# Patient Record
Sex: FEMALE | Race: WHITE | NOT HISPANIC OR LATINO | Employment: FULL TIME | URBAN - METROPOLITAN AREA
[De-identification: names, ages, dates, MRNs, and addresses within clinical notes are randomized per-mention and may not be internally consistent; named-entity substitution may affect disease eponyms.]

---

## 2019-04-14 ENCOUNTER — OCCUPATIONAL MEDICINE (OUTPATIENT)
Dept: URGENT CARE | Facility: PHYSICIAN GROUP | Age: 64
End: 2019-04-14
Payer: COMMERCIAL

## 2019-04-14 VITALS
TEMPERATURE: 98.9 F | DIASTOLIC BLOOD PRESSURE: 78 MMHG | OXYGEN SATURATION: 96 % | HEIGHT: 62 IN | HEART RATE: 82 BPM | SYSTOLIC BLOOD PRESSURE: 124 MMHG | RESPIRATION RATE: 16 BRPM

## 2019-04-14 DIAGNOSIS — S66.912A HAND STRAIN, LEFT, INITIAL ENCOUNTER: ICD-10-CM

## 2019-04-14 PROCEDURE — 99204 OFFICE O/P NEW MOD 45 MIN: CPT | Performed by: PHYSICIAN ASSISTANT

## 2019-04-14 NOTE — LETTER
"   [facility] [place]  [address] [place] [address]. [address] - [place] [address]  Phone:  [phone] - Fax:  [phone]   [facility] Progress Report and Disability Certification  Date of Service: 4/14/2019   No Show:  No  Date / Time of Next Visit:  04/21/2019 @ 10:40 AM    Claim Information   Patient Name: Stefany Thornton  Claim Number:     Employer: [employer]  Date of Injury: 11/15/2018     Insurer / TPA:    ID / SSN:     Occupation: Plate Repair   Diagnosis: The encounter diagnosis was Hand strain, left, initial encounter.    Medical Information   Related to Industrial Injury?   Comments:indeterminate    Subjective Complaints:  DOI: 11/15/18. Left fingers and hand pain. Repetitive motion injury. Pt does gripping movements at work. Mainly 3rd and 4th fingers. Feels stiff in am, some numbness. \"Very rigid for several minutes in the morning.\" Progressively worsening. Taking NSAIDS and using OTC creams. Does not radiate. No previous injuries. No 2nd job.   Objective Findings: No obvious tenderness or deformity on exam.  When I do force full flexion of the third and fourth digit of the left hand she does feel a pulling sensation.  Range of motion and wrist within normal limits.  Finkelstein test negative.  Phalen's test negative.   strength equal.  She states it is worse in the morning and after work.   Pre-Existing Condition(s): None   Assessment:   Initial Visit    Status: Additional Care Required  Permanent Disability:No    Plan:      Diagnostics:      Comments:       Disability Information   Status: Released to Full Duty    From:     Through:   Restrictions are:     Physical Restrictions   Sitting:    Standing:    Stooping:    Bending:      Squatting:    Walking:    Climbing:    Pushing:      Pulling:    Other:    Reaching Above Shoulder (L):   Reaching Above Shoulder (R):       Reaching Below Shoulder (L):    Reaching Below Shoulder (R):      Not to exceed Weight " Limits   Carrying(hrs):   Weight Limit(lb):   Lifting(hrs):   Weight  Limit(lb):     Comments: Must wear brace at work    Repetitive Actions   Hands: i.e. Fine Manipulations from Grasping:     Feet: i.e. Operating Foot Controls:     Driving / Operate Machinery:     Physician Name: Inga Eaton P.A.-C. Physician Signature: INGA Rojo P.A.-C. e-Signature: Dr. Rory Suarez, Medical Director   Clinic Name / Location: [address]  [place] [address] Clinic Phone Number: Dept: [phone]   Appointment Time: 11:15 Am Visit Start Time: 11:48 AM   Check-In Time:  11:25 Am Visit Discharge Time:  12:21 PM    Original-Treating Physician or Chiropractor    Page 2-Insurer/TPA    Page 3-Employer    Page 4-Employee

## 2019-04-14 NOTE — PROGRESS NOTES
"Subjective:      Stefany Thornton is a 64 y.o. female who presents with Wrist Pain (Wrist and finger pain x 6 months)      DOI: 11/15/18. Left fingers and hand pain. Repetitive motion injury. Pt does gripping movements at work. Mainly 3rd and 4th fingers. Feels stiff in am, some numbness. \"Very rigid for several minutes in the morning.\" Progressively worsening. Taking NSAIDS and using OTC creams. Does not radiate. No previous injuries. No 2nd job.     HPI    ROS    Medications, Allergies, and current problem list reviewed today in Epic     Objective:     /78   Pulse 82   Temp 37.2 °C (98.9 °F) (Temporal)   Resp 16   Ht 1.575 m (5' 2\")   SpO2 96%      Physical Exam   Constitutional: She is oriented to person, place, and time. She appears well-developed and well-nourished. No distress.   Neurological: She is alert and oriented to person, place, and time.   Skin: Skin is warm and dry. She is not diaphoretic.   Psychiatric: She has a normal mood and affect. Her behavior is normal. Judgment and thought content normal.   Nursing note and vitals reviewed.      No obvious tenderness or deformity on exam.  When I do force full flexion of the third and fourth digit of the left hand she does feel a pulling sensation.  Range of motion and wrist within normal limits.  Finkelstein test negative.  Phalen's test negative.   strength equal.  She states it is worse in the morning and after work.       Assessment/Plan:     1. Hand strain, left, initial encounter       Overuse injury.  Patient placed in a brace  OTC meds and conservative measures as discussed  Follow-up 1 week  Please note that this dictation was created using voice recognition software. I have made every reasonable attempt to correct obvious errors, but I expect that there are errors of grammar and possibly content that I did not discover before finalizing the note.    "

## 2019-04-14 NOTE — LETTER
"EMPLOYEE’S CLAIM FOR COMPENSATION/ REPORT OF INITIAL TREATMENT  FORM C-4    EMPLOYEE’S CLAIM - PROVIDE ALL INFORMATION REQUESTED   First Name  Stefany Last Name  Hillary Birthdate                    1955                Sex  female Claim Number   Home Address  [address]  Age  64 y.o. 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 (if applicable)  Plate Repair    How did this injury or occupational disease occur? (Be specific an answer in detail. Use additional sheet if necessary)  On going hand/fingers pain, tingling, numbness occasionally but getting worse past couple of months. Annual physical-Nurse advised me to have it checked    If you believe that you have an occupational disease, when did you first have knowledge of the disability and it relationship to your employment?  n/a Witnesses to the Accident  n/a      Nature of Injury or Occupational Disease  Strain  Part(s) of Body Injured or Affected  Hand (L), Finger (L),     I certify that the above is true and correct to the best of my knowledge and that I have provided this information in order to obtain the benefits of [place]’s " [place] inclusive or [place] of [place]).  I hereby authorize any physician, chiropractor, surgeon, practitioner, or other person, any hospital, including [facility], any medical service organization, any insurance company, or other institution or organization to release to each other, any medical or other information, including benefits paid or payable, pertinent to this injury or disease, except information relative to diagnosis, treatment and/or counseling for AIDS, psychological conditions, alcohol or controlled substances, for which I must give specific authorization.  A Photostat of this authorization shall be as valid as the original.     Date   Place   Employee’s Signature   THIS REPORT MUST BE COMPLETED AND MAILED WITHIN 3 WORKING DAYS OF TREATMENT   Place  [facility]  Name of Facility  [facility]   Date  4/14/2019 Diagnosis  (S66.912A) Hand strain, left, initial encounter Is there evidence the injured employee was under the influence of alcohol and/or another controlled substance at the time of accident?   Hour  11:48 AM Description of Injury or Disease  The encounter diagnosis was Hand strain, left, initial encounter. No   Treatment  Overuse injury  OTC meds and conservative measures as discussed    Have you advised the patient to remain off work five days or more? No   X-Ray Findings      If Yes   From Date  To Date      From information given by the employee, together with medical evidence, can you directly connect this injury or occupational disease as job incurred?    Comments:Indeterminate If No Full Duty  Yes Modified Duty      Is additional medical care by a physician indicated?  Yes If Modified Duty, Specify any Limitations / Restrictions      Do you know of any previous injury or disease contributing to this condition or occupational disease?                            No  "  Date  4/14/2019 Print Doctor’s Name Inga Eaton P.A.-C. I certify the employer’s copy of  this form was mailed on:   Address  [address] Insurer’s Use Only     [place] [place] Zip  [address]    Provider’s Tax ID Number  [id number] Telephone  Dept: [phone]        [email or web address] Almeida P.A.-C.   e-Signature: Dr. Rory Suarez, Medical Director Degree  QUINN        ORIGINAL-TREATING PHYSICIAN OR CHIROPRACTOR    PAGE 2-INSURER/TPA    PAGE 3-EMPLOYER    PAGE 4-EMPLOYEE             Form C-4 (rev10/07)              BRIEF DESCRIPTION OF RIGHTS AND BENEFITS  (Pursuant to [place]RS 616C.050)    Notice of Injury or Occupational Disease (Incident Report Form C-1): If an injury or occupational disease (OD) arises out of and in the  course of employment, you must provide written notice to your employer as soon as practicable, but no later than 7 days after the accident or  OD. Your employer shall maintain a sufficient supply of the required forms.    Claim for Compensation (Form C-4): If medical treatment is sought, the form C-4 is available at the place of initial treatment. A completed  \"Claim for Compensation\" (Form C-4) must be filed within 90 days after an accident or OD. The treating physician or chiropractor must,  within 3 working days after treatment, complete and mail to the employer, the employer's insurer and third-party , the Claim for  Compensation.    Medical Treatment: If you require medical treatment for your on-the-job injury or OD, you may be required to select a physician or  chiropractor from a list provided by your workers’ compensation insurer, if it has contracted with an Organization for Managed Care (MCO) or  Preferred Provider Organization (PPO) or providers of health care. If your employer has not entered into a contract with an MCO or PPO, you  may select a physician or chiropractor from the Panel of Physicians and Chiropractors. Any medical costs related to " your industrial injury or  OD will be paid by your insurer.    Temporary Total Disability (TTD): If your doctor has certified that you are unable to work for a period of at least 5 consecutive days, or 5  cumulative days in a 20-day period, or places restrictions on you that your employer does not accommodate, you may be entitled to TTD  compensation.    Temporary Partial Disability (TPD): If the wage you receive upon reemployment is less than the compensation for TTD to which you are  entitled, the insurer may be required to pay you TPD compensation to make up the difference. TPD can only be paid for a maximum of 24  months.    Permanent Partial Disability (PPD): When your medical condition is stable and there is an indication of a PPD as a result of your injury or  OD, within 30 days, your insurer must arrange for an evaluation by a rating physician or chiropractor to determine the degree of your PPD. The  amount of your PPD award depends on the date of injury, the results of the PPD evaluation and your age and wage.    Permanent Total Disability (PTD): If you are medically certified by a treating physician or chiropractor as permanently and totally disabled  and have been granted a PTD status by your insurer, you are entitled to receive monthly benefits not to exceed 66 2/3% of your average  monthly wage. The amount of your PTD payments is subject to reduction if you previously received a PPD award.    Vocational Rehabilitation Services: You may be eligible for vocational rehabilitation services if you are unable to return to the job due to a  permanent physical impairment or permanent restrictions as a result of your injury or occupational disease.    Transportation and Per Lisset Reimbursement: You may be eligible for travel expenses and per lisset associated with medical treatment.    Reopening: You may be able to reopen your claim if your condition worsens after claim closure.    Appeal Process: If you disagree  with a written determination issued by the insurer or the insurer does not respond to your request, you may  appeal to the Department of Administration, , by following the instructions contained in your determination letter. You must  appeal the determination within 70 days from the date of the determination letter at [address], [address], [place], [place]  [address], or [address], [place], [place] [address]. If you disagree with the  decision, you may appeal to the  Department of Administration, . You must file your appeal within 30 days from the date of the  decision  letter at [address], [address], [place], [place] [address], or [address] [facility] [address], [place], [place] [address]. If you  disagree with a decision of an , you may file a petition for judicial review with the District Court. You must do so within 30  days of the Appeal Officer’s decision. You may be represented by an  at your own expense or you may contact the [facility] for possible  representation.    [place]  for Injured Workers (NAIW): If you disagree with a  decision, you may request that NAIW represent you  without charge at an  Hearing. For information regarding denial of benefits, you may contact the [facility] at: [address], [address], [place] [address], [phone], or [address], [place] [address], [phone]    To File a Complaint with the Division: If you wish to file a complaint with the  of the Division of Industrial Relations (DIR),  please contact the Workers’ Compensation Section, [address], [address], [place], [place] [address], telephone [phone]) [phone], or  [address] [facility] [place], [place] [address], telephone [phone].    For assistance with Workers’ Compensation Issues: you  may contact the Office of the Governor Consumer Health Assistance, [address] [place] [address], [facility] [address], [place] [address], Toll Free 1-465.876.3810, Web site: http://govcha.[facility].[place].us, E-mail  [email or web address].                                                                                                                                                                                                                                   __________________________________________________________________                                                                   _________________                Employee Name / Signature                                                                                                                                                       Date                                                                                                                                                                                                     D-2 (rev. 10/07)

## 2019-04-19 ENCOUNTER — OCCUPATIONAL MEDICINE (OUTPATIENT)
Dept: URGENT CARE | Facility: PHYSICIAN GROUP | Age: 64
End: 2019-04-19
Payer: COMMERCIAL

## 2019-04-19 VITALS
TEMPERATURE: 98.9 F | DIASTOLIC BLOOD PRESSURE: 80 MMHG | OXYGEN SATURATION: 94 % | SYSTOLIC BLOOD PRESSURE: 122 MMHG | HEIGHT: 62 IN | HEART RATE: 76 BPM

## 2019-04-19 DIAGNOSIS — S66.912D HAND STRAIN, LEFT, SUBSEQUENT ENCOUNTER: ICD-10-CM

## 2019-04-19 PROCEDURE — 99214 OFFICE O/P EST MOD 30 MIN: CPT | Performed by: PHYSICIAN ASSISTANT

## 2019-04-19 NOTE — PROGRESS NOTES
"Subjective:      Stefany Thornton is a 64 y.o. female who presents with Hand Injury (WC L hand pain, pt states it still hurts, pt states in the morning she isnt able to move it)      DOI: 11/15/19.  Left hand strain, overuse injury.  Some improvement last week.  Still having pain and stiffness in the a.m.  Wearing brace at work and when sleeping.  Tolerating work.  No new symptoms.     HPI    ROS    Medications, Allergies, and current problem list reviewed today in Epic     Objective:     /80 (BP Location: Left arm, Patient Position: Sitting, BP Cuff Size: Adult)   Pulse 76   Temp 37.2 °C (98.9 °F) (Temporal)   Ht 1.575 m (5' 2\")   SpO2 94%      Physical Exam   Constitutional: She is oriented to person, place, and time. She appears well-developed and well-nourished. No distress.   Neurological: She is alert and oriented to person, place, and time.   Skin: Skin is warm and dry. She is not diaphoretic.   Psychiatric: She has a normal mood and affect. Her behavior is normal. Judgment and thought content normal.   Nursing note and vitals reviewed.      No obvious tenderness or deformity on exam.  When I do force full flexion of the third and fourth digit of the left hand she does feel a pulling sensation.  Range of motion and wrist within normal limits.  Finkelstein test negative.  Phalen's test negative.   strength equal.  She states it is worse in the morning and after work.       Assessment/Plan:     1. Hand strain, left, subsequent encounter  REFERRAL TO PHYSICAL THERAPY Reason for Therapy: Eval/Treat/Report     No significant improvement.  Continue to wear brace.  Referral to physical therapy    Please note that this dictation was created using voice recognition software. I have made every reasonable attempt to correct obvious errors, but I expect that there are errors of grammar and possibly content that I did not discover before finalizing the note.    "

## 2019-04-19 NOTE — LETTER
[facility]  [address] [place] [address]. [address] - [place] [address]  Phone:  [phone] - Fax:  [phone]   [facility] Progress Report and Disability Certification  Date of Service: 4/19/2019   No Show:  No  Date / Time of Next Visit:  2 weeks   Claim Information   Patient Name: Stefany Thornton  Claim Number:     Employer: [employer]  Date of Injury: 11/15/2018     Insurer / TPA: [facility] [employer]  ID / SSN:     Occupation: Plate Repair   Diagnosis: The encounter diagnosis was Hand strain, left, subsequent encounter.    Medical Information   Related to Industrial Injury?   Comments:Indeterminate    Subjective Complaints:  DOI: 11/15/19.  Left hand strain, overuse injury.  Some improvement last week.  Still having pain and stiffness in the a.m.  Wearing brace at work and when sleeping.  Tolerating work.  No new symptoms.   Objective Findings: No obvious tenderness or deformity on exam.  When I do force full flexion of the third and fourth digit of the left hand she does feel a pulling sensation.  Range of motion and wrist within normal limits.  Finkelstein test negative.  Phalen's test negative.   strength equal.  She states it is worse in the morning and after work.   Pre-Existing Condition(s):     Assessment:   Condition Same    Status: Additional Care Required  Permanent Disability:No    Plan: PT    Diagnostics:      Comments:       Disability Information   Status: Released to Full Duty    From:     Through:   Restrictions are:     Physical Restrictions   Sitting:    Standing:    Stooping:    Bending:      Squatting:    Walking:    Climbing:    Pushing:      Pulling:    Other:    Reaching Above Shoulder (L):   Reaching Above Shoulder (R):       Reaching Below Shoulder (L):    Reaching Below Shoulder (R):      Not to exceed Weight Limits   Carrying(hrs):   Weight Limit(lb):   Lifting(hrs):   Weight  Limit(lb):     Comments:      Repetitive Actions   Hands:  i.e. Fine Manipulations from Grasping:     Feet: i.e. Operating Foot Controls:     Driving / Operate Machinery:     Physician Name: Inga Eaton P.A.-C. Physician Signature: INGA Rojo P.A.-C. e-Signature: Dr. Rory Suarez, Medical Director   Clinic Name / Location: [address]  [place] [address] Clinic Phone Number: Dept: [phone]   Appointment Time: 4:05 Pm Visit Start Time: 4:07 PM   Check-In Time:  4:03 Pm Visit Discharge Time:  4:37 PM    Original-Treating Physician or Chiropractor    Page 2-Insurer/TPA    Page 3-Employer    Page 4-Employee

## 2019-05-03 ENCOUNTER — OCCUPATIONAL MEDICINE (OUTPATIENT)
Dept: URGENT CARE | Facility: PHYSICIAN GROUP | Age: 64
End: 2019-05-03
Payer: COMMERCIAL

## 2019-05-03 VITALS
HEIGHT: 62 IN | TEMPERATURE: 98.3 F | HEART RATE: 108 BPM | DIASTOLIC BLOOD PRESSURE: 82 MMHG | SYSTOLIC BLOOD PRESSURE: 120 MMHG | OXYGEN SATURATION: 94 %

## 2019-05-03 DIAGNOSIS — S66.912S: ICD-10-CM

## 2019-05-03 DIAGNOSIS — M77.8 TENDINITIS OF LEFT HAND: ICD-10-CM

## 2019-05-03 PROCEDURE — 99213 OFFICE O/P EST LOW 20 MIN: CPT | Performed by: PHYSICIAN ASSISTANT

## 2019-05-03 ASSESSMENT — ENCOUNTER SYMPTOMS
RESPIRATORY NEGATIVE: 1
NEUROLOGICAL NEGATIVE: 1
CONSTITUTIONAL NEGATIVE: 1
GASTROINTESTINAL NEGATIVE: 1

## 2019-05-03 NOTE — PROGRESS NOTES
"Subjective:      Stefany Thornton is a 64 y.o. female who presents with Arm Injury ( FUV pt states her arm is doing better, fingers are still hard to move)      DOI: 11/15/18.  \"Left hand strain, overuse injury.  Some improvement last week.  Still having pain and stiffness in the a.m.  Wearing brace at work and when sleeping.  Tolerating work.  No new symptoms.\"  Patient follows up to report some mild improvement at this visit. She was referred to PT but has not made contact with referral department yet.  Brace helps and she is tolerating her full duties.   No new symptoms.  Mild improvement in ROM.      Arm Injury     as above.     Review of Systems   Constitutional: Negative.    Respiratory: Negative.    Gastrointestinal: Negative.    Musculoskeletal:        SEE HPI   Skin: Negative.    Neurological: Negative.    Endo/Heme/Allergies: Negative.        PMH:  has no past medical history on file.  MEDS: No current outpatient prescriptions on file.  ALLERGIES: No Known Allergies  SURGHX: No past surgical history on file.  SOCHX:  reports that she has never smoked. She has never used smokeless tobacco.  FH: Family history was reviewed, no pertinent findings to report   Objective:     /82 (BP Location: Left arm, Patient Position: Sitting, BP Cuff Size: Adult)   Pulse (!) 108   Temp 36.8 °C (98.3 °F) (Temporal)   Ht 1.575 m (5' 2\")   SpO2 94%      Physical Exam   Constitutional: She is oriented to person, place, and time. She appears well-developed and well-nourished. No distress.   Cardiovascular: Normal rate.    Pulmonary/Chest: Effort normal.   Neurological: She is alert and oriented to person, place, and time.   Skin: Skin is warm and dry.     Left hand:  There is TTP on palmar surface near head of 3rd and 4th metacarpals.   There is no overt swelling.  FROM of digits however mild pain with full flexion of 3rd and 4th digits.  Distal CMS WNL       Assessment/Plan:     1. Hand strain, left, sequela     2. " Tendinitis of left hand         PT follow up.  Referral has been received, instructed to make appt to get this sched.   Continue NSAID prn.  Brace prn.   Transfer to [facility] for follow up after PT start    Danielle Johns P.A.-C.

## 2019-05-03 NOTE — LETTER
"   [facility]  [address] [place] [address]. [address] - [place] [address]  Phone:  [phone] - Fax:  [phone]   [facility] Progress Report and Disability Certification  Date of Service: 5/3/2019   No Show:  No  Date / Time of Next Visit: 5/21/2019   Claim Information   Patient Name: Stefany Thornton  Claim Number:     Employer: [employer]  Date of Injury: 11/15/2018     Insurer / TPA: [employer]  ID / SSN:     Occupation: Plate Repair   Diagnosis: Diagnoses of Hand strain, left, sequela and Tendinitis of left hand were pertinent to this visit.    Medical Information   Related to Industrial Injury?   Comments:Indeterminate     Subjective Complaints:  DOI: 11/15/18.  \"Left hand strain, overuse injury.  Some improvement last week.  Still having pain and stiffness in the a.m.  Wearing brace at work and when sleeping.  Tolerating work.  No new symptoms.\"  Patient follows up to report some mild improvement at this visit. She was referred to PT but has not made contact with referral department yet.  Brace helps and she is tolerating her full duties.   No new symptoms.  Mild improvement in ROM.    Objective Findings: Left hand:  There is TTP on palmar surface near head of 3rd and 4th metacarpals.   There is no overt swelling.  FROM of digits however mild pain with full flexion of 3rd and 4th digits.  Distal CMS WNL   Pre-Existing Condition(s):     Assessment:   Condition Improved    Status: Discharged / Care Transfer  Permanent Disability:No    Plan: PT    Diagnostics:      Comments:       Disability Information   Status: Released to Full Duty    From:  5/3/2019  Through: 5/21/2019 Restrictions are:     Physical Restrictions   Sitting:    Standing:    Stooping:    Bending:      Squatting:    Walking:    Climbing:    Pushing:      Pulling:    Other:    Reaching Above Shoulder (L):   Reaching Above Shoulder (R):       Reaching Below Shoulder (L):    Reaching Below " Shoulder (R):      Not to exceed Weight Limits   Carrying(hrs):   Weight Limit(lb):   Lifting(hrs):   Weight  Limit(lb):     Comments: PT follow up.  Referral has been received, instructed to make appt to get this sched.   Continue NSAID prn.  Brace prn.   Transfer to [facility] for follow up after PT start    Repetitive Actions   Hands: i.e. Fine Manipulations from Grasping:     Feet: i.e. Operating Foot Controls:     Driving / Operate Machinery:     Physician Name: Abhijit Johns P.A.-C. Physician Signature: ABHIJIT Cheema P.A.-C. e-Signature: Dr. Rory Suarez, Medical Director   Clinic Name / Location: [address]  [place] [address] Clinic Phone Number: Dept: [phone]   Appointment Time: 12:40 Pm Visit Start Time: 12:44 PM   Check-In Time:  12:37 Pm Visit Discharge Time: 1:27 PM   Original-Treating Physician or Chiropractor    Page 2-Insurer/TPA    Page 3-Employer    Page 4-Employee

## 2019-07-15 ENCOUNTER — OFFICE VISIT (OUTPATIENT)
Dept: URGENT CARE | Facility: PHYSICIAN GROUP | Age: 64
End: 2019-07-15
Payer: COMMERCIAL

## 2019-07-15 ENCOUNTER — APPOINTMENT (OUTPATIENT)
Dept: RADIOLOGY | Facility: IMAGING CENTER | Age: 64
End: 2019-07-15
Attending: NURSE PRACTITIONER
Payer: COMMERCIAL

## 2019-07-15 VITALS
HEIGHT: 62 IN | SYSTOLIC BLOOD PRESSURE: 130 MMHG | DIASTOLIC BLOOD PRESSURE: 80 MMHG | OXYGEN SATURATION: 98 % | HEART RATE: 88 BPM | RESPIRATION RATE: 18 BRPM | TEMPERATURE: 98.4 F

## 2019-07-15 DIAGNOSIS — M25.461 EFFUSION OF RIGHT KNEE: ICD-10-CM

## 2019-07-15 DIAGNOSIS — M25.561 ACUTE PAIN OF RIGHT KNEE: ICD-10-CM

## 2019-07-15 PROCEDURE — 99214 OFFICE O/P EST MOD 30 MIN: CPT | Performed by: NURSE PRACTITIONER

## 2019-07-15 PROCEDURE — 73564 X-RAY EXAM KNEE 4 OR MORE: CPT | Mod: TC,RT | Performed by: NURSE PRACTITIONER

## 2019-07-15 RX ORDER — MELOXICAM 7.5 MG/1
7.5 TABLET ORAL
Qty: 30 TAB | Refills: 0 | Status: SHIPPED | OUTPATIENT
Start: 2019-07-15 | End: 2020-09-02

## 2019-07-15 NOTE — PROGRESS NOTES
Chief Complaint   Patient presents with   • Knee Pain     right knee pain, has been hurting for several months. hurts when she walks and goes up and down stairs.        HISTORY OF PRESENT ILLNESS: Patient is a 64 y.o. female who presents to urgent care today with complaints of right knee pain. Notes that for the past 7 weeks she has had right knee pain. She cannot recall any injury but pain has been worsening. Pain started posterior but now has progressed to medial. Pain is exacerbated by ambulation and stairs. She has tried ibuprofen and a brace for symptom relief.  She denies previous injuries to this knee.  She otherwise feels well and denies any fever, chills, malaise.        There are no active problems to display for this patient.      Allergies:Patient has no known allergies.    No current [facility]-ordered outpatient prescriptions on file.     No current [facility]-ordered facility-administered medications on file.        History reviewed. No pertinent past medical history.    Social History   Substance Use Topics   • Smoking status: Never Smoker   • Smokeless tobacco: Never Used   • Alcohol use Not on file       No family status information on file.   History reviewed. No pertinent family history.    ROS:  Review of Systems   Constitutional: Negative for fever, chills, weight loss, malaise, and fatigue.   HENT: Negative for ear pain, nosebleeds, congestion, sore throat and neck pain.    Eyes: Negative for vision changes.   Neuro: Negative for headache, sensory changes, weakness, seizure, LOC.   Cardiovascular: Negative for chest pain, palpitations, orthopnea and leg swelling.   Respiratory: Negative for cough, sputum production, shortness of breath and wheezing.   Gastrointestinal: Negative for abdominal pain, nausea, vomiting or diarrhea.   Genitourinary: Negative for dysuria, urgency and frequency.  Musculoskeletal: Positive for right knee pain.  Negative for falls, neck pain, back pain, myalgias.   Skin: Negative  "for rash, diaphoresis.     Exam:  /80 (BP Location: Right arm, Patient Position: Sitting, BP Cuff Size: Adult)   Pulse 88   Temp 36.9 °C (98.4 °F) (Temporal)   Resp 18   Ht 1.575 m (5' 2\")   SpO2 98%   General: well-nourished, well-developed female in NAD  Head: normocephalic, atraumatic  Eyes: PERRLA, no conjunctival injection, acuity grossly intact, lids normal.  Ears: normal shape and symmetry, no tenderness, no discharge. External canals are without any significant edema or erythema. Gross auditory acuity is intact.  Nose: symmetrical without tenderness, no discharge.  Mouth/Throat: reasonable hygiene, no erythema, exudates or tonsillar enlargement.  Neck: no masses, range of motion within normal limits, no tracheal deviation. No obvious thyroid enlargement.   Lymph: no cervical adenopathy. No supraclavicular adenopathy.   Neuro: alert and oriented. Cranial nerves 1-12 grossly intact. No sensory deficit.   Cardiovascular: regular rate and rhythm. No edema.  Pulmonary: no distress. Chest is symmetrical with respiration, no wheezes, crackles, or rhonchi.   Musculoskeletal: no clubbing, appropriate muscle tone, gait is stable.  Right knee: normal in appearance, mild soft tissue tenderness to medial aspect, full range of motion, no obvious laxity noted.  Skin: warm, dry, intact, no clubbing, no cyanosis, no rashes.   Psych: appropriate mood, affect, judgement.         Assessment/Plan:    1. Acute pain of right knee  DX-KNEE COMPLETE 4+ RIGHT    meloxicam (MOBIC) 7.5 MG Tab    REFERRAL TO ORTHOPEDICS   2. Effusion of right knee  meloxicam (MOBIC) 7.5 MG Tab    REFERRAL TO ORTHOPEDICS             7/15/2019 8:28 AM    HISTORY/REASON FOR EXAM:  Atraumatic Pain/Swelling/Deformity      TECHNIQUE/EXAM DESCRIPTION AND NUMBER OF VIEWS:  4 views of the RIGHT knee.    COMPARISON: None    FINDINGS:  There is no evidence of fracture or dislocation. Alignment is maintained.   There is spurring of the superior aspect of " the patella. There is a small joint effusion.     Impression       No evidence of acute fracture or dislocation.    Small joint effusion.         X-ray negative, suspect soft tissue etiology. Placed in hinged knee brace. Given a referral to ortho for follow up as MRI may be indicated. RICE. Mobic, take with food, DC OTC NSAIDS.   Supportive care, differential diagnoses, and indications for immediate follow-up discussed with patient.   Pathogenesis of diagnosis discussed including typical length and natural progression.   Instructed to return to clinic or nearest emergency department for any change in condition, further concerns, or worsening of symptoms.  Patient states understanding of the plan of care and discharge instructions.  Instructed to make an appointment, for follow up, with her primary care provider.        Please note that this dictation was created using voice recognition software. I have made every reasonable attempt to correct obvious errors, but I expect that there are errors of grammar and possibly content that I did not discover before finalizing the note.      SOPHIA Zabala.

## 2020-09-02 ENCOUNTER — OFFICE VISIT (OUTPATIENT)
Dept: MEDICAL GROUP | Facility: LAB | Age: 65
End: 2020-09-02
Payer: COMMERCIAL

## 2020-09-02 VITALS
BODY MASS INDEX: 32.07 KG/M2 | HEART RATE: 88 BPM | TEMPERATURE: 97.1 F | RESPIRATION RATE: 16 BRPM | OXYGEN SATURATION: 96 % | DIASTOLIC BLOOD PRESSURE: 80 MMHG | WEIGHT: 181 LBS | SYSTOLIC BLOOD PRESSURE: 140 MMHG | HEIGHT: 63 IN

## 2020-09-02 DIAGNOSIS — M17.0 PRIMARY OSTEOARTHRITIS OF BOTH KNEES: ICD-10-CM

## 2020-09-02 DIAGNOSIS — Z12.11 SCREEN FOR COLON CANCER: ICD-10-CM

## 2020-09-02 DIAGNOSIS — Z12.11 SCREENING FOR COLORECTAL CANCER: ICD-10-CM

## 2020-09-02 DIAGNOSIS — Z12.31 ENCOUNTER FOR SCREENING MAMMOGRAM FOR BREAST CANCER: ICD-10-CM

## 2020-09-02 DIAGNOSIS — Z12.12 SCREENING FOR COLORECTAL CANCER: ICD-10-CM

## 2020-09-02 DIAGNOSIS — L30.9 ECZEMA, UNSPECIFIED TYPE: ICD-10-CM

## 2020-09-02 DIAGNOSIS — F17.200 SMOKER: ICD-10-CM

## 2020-09-02 DIAGNOSIS — Z00.00 PREVENTATIVE HEALTH CARE: ICD-10-CM

## 2020-09-02 DIAGNOSIS — Z80.0 FAMILY HISTORY OF COLON CANCER: ICD-10-CM

## 2020-09-02 PROCEDURE — 99215 OFFICE O/P EST HI 40 MIN: CPT | Performed by: NURSE PRACTITIONER

## 2020-09-02 RX ORDER — TRIAMCINOLONE ACETONIDE 1 MG/G
CREAM TOPICAL
Qty: 45 G | Refills: 2 | Status: SHIPPED | OUTPATIENT
Start: 2020-09-02 | End: 2022-10-19

## 2020-09-02 RX ORDER — VITAMIN E 268 MG
400 CAPSULE ORAL DAILY
COMMUNITY
End: 2022-10-19

## 2020-09-02 RX ORDER — MAGNESIUM OXIDE/MAG AA CHELATE 300 MG
CAPSULE ORAL DAILY
COMMUNITY

## 2020-09-02 RX ORDER — MULTIVIT WITH MINERALS/LUTEIN
TABLET ORAL DAILY
COMMUNITY

## 2020-09-02 RX ORDER — MELOXICAM 7.5 MG/1
7.5 TABLET ORAL
Qty: 90 TAB | Refills: 1 | Status: SHIPPED | OUTPATIENT
Start: 2020-09-02 | End: 2021-07-09 | Stop reason: SDUPTHER

## 2020-09-02 ASSESSMENT — PATIENT HEALTH QUESTIONNAIRE - PHQ9: CLINICAL INTERPRETATION OF PHQ2 SCORE: 0

## 2020-09-02 NOTE — ASSESSMENT & PLAN NOTE
Chronic issue.  Right knee hurts worse than left.  Right knee aches.  voltaren gel and meloxicam are helpful.  Also takes tylenol prn.  Has not seen orthopedics. Had x-rays one year ago - showed arthritis.   Use to do up to 200 squats per day.   Hears clicking / popping in right knee.

## 2020-09-02 NOTE — ASSESSMENT & PLAN NOTE
"Mother at age 84.  Patient's bowel pattern has changed from formed \"cat like stools,\" to random bouts of diarrhea a few days per week for a few hours.   Denies black / bloody stools but has cramping occasionally.  No unintentional weight loss.    "

## 2020-09-02 NOTE — PROGRESS NOTES
"Chief Complaint   Patient presents with   • Bowel Problem   • Rash     right leg X 1 year   • Establish Care       HPI  Nette is a 64 yo female, here to establish care today, she is the wife of Urban Thornton, an established patient with me.  She mentions a past medical history of knee arthritis, otherwise states she is very healthy.  She is still working full-time, stands on her feet for up to 10 hours a day at the [employer].  She is a smoker although she tells me in her 30s that she stopped smoking for about 10 years.  She drinks 1-2 drinks most nights of the week.  She does not regularly exercise.  See family and surgical history below.  No daily prescription medications.  Has never had a colonoscopy, has not had a mammogram in years and is overdue for a Pap smear, cannot recall her last Pap smear.  Has not had blood work done in several years.  Cannot recall her last vaccination.    Primary osteoarthritis of both knees  Chronic issue.  Right knee hurts worse than left.  Right knee aches.  voltaren gel and meloxicam are helpful.  Also takes tylenol prn.  Has not seen orthopedics. Had x-rays one year ago - showed arthritis.   Use to do up to 200 squats per day.   Hears clicking / popping in right knee.        Family history of colon cancer  Mother at age 84.  Patient's bowel pattern has changed from formed \"cat like stools,\" to random bouts of diarrhea a few days per week for a few hours.   Denies black / bloody stools but has cramping occasionally.  No unintentional weight loss.        Family History   Problem Relation Age of Onset   • Heart Disease Mother    • Cancer Mother         colon   • Cancer Father         prostate   • Cancer Brother         lung - smoker     Past Surgical History:   Procedure Laterality Date   • HAND SURGERY      right wrist ligament contraction release in her 30's       Review Of Systems  Denies fever, chills, or sweats, unexplained weight changes (not following diet currently or " "exercising)  Skin: negative for rash, changing moles, abnormal pigmentation, hair or nail changes.  Eyes: negative for visual blurring, double vision, eye pain, floaters and discharge from eyes  Ears/Nose/Throat: negative for tinnitus, vertigo, oral or dental problem, hoarseness, frequent URI's, sinus trouble, persistent sore throat..  Respiratory: negative for persistent cough, hemoptysis, dyspnea, wheezing  Cardiovascular: negative for palpitations, tachycardia, irregular heart beat, chest pain or pressure or peripheral edema.   Breast: Denies breast tenderness, mass,  changes in size or contour, or abnormal cyclic discomfort.  Gastrointestinal: Positive for stool pattern change as above but this is been ongoing for years.  Denies nausea or vomiting.  Genitourinary: negative for dysuria, frequency, incontinence, abnormal vaginal discharge, dysparunia or abnormal vaginal bleeding. Has nocturia once per night.   Musculoskeletal: Positive for bilateral knee pain.  Neurologic: negative for new or changing headaches, new weakness tremor  Psychiatric: negative for mood disturbance, anxiety, depression, sexual difficulties  Hematologic/Lymphatic/Immunologic: negative for pallor, unusual bruising, swollen glands, HIV risk factors  Endocrine: negative for temperature intolerance, polydipsia, polyuria.    Exam:  /80   Pulse 88   Temp 36.2 °C (97.1 °F)   Resp 16   Ht 1.6 m (5' 3\")   Wt 82.1 kg (181 lb)   SpO2 96%   Constitutional: Alert, no distress, plus 3 vital signs  Skin:  Warm, dry, no rashes invisible areas.  She has a few patches of dry skin to her anterior right calf and a small patch to her right thigh that she inquires about, stating these patches come and go and have for years.  The patches she states are typically dry and itchy.  Eye: Equal, round and reactive, conjunctiva clear  ENMT: Lips without lesions, good dentition, oropharynx clear    Neck: Trachea midline, no masses, no " thyromegaly  Respiratory: Unlabored respiration, lungs clear to auscultation, no wheezes, no rhonchi  Cardiovascular: Normal rate and rhythm, no murmur, no edema  Abdomen: Soft, nontender.  Musculoskeletal: She does have a slight swelling to her right knee without erythema or decreased range of motion.  She experiences pain with active resistance to extension and standing only on her right knee.  Otherwise she has full range of motion to the rest of her joints.  Psych: Alert, pleasant, well-groomed, normal affect    Assessment / Plan / Medical Decision makin. Primary osteoarthritis of both knees  -She would like to have an MRI of her right knee as it is been increasingly painful for her over the past few years.  She has benefited from meloxicam and Voltaren gel in the past and these were refilled for her.  Discussed GI and renal precautions with meloxicam.  Based on MRI, she is interested in seeing orthopedics but not interested in physical therapy at this time.  We discussed braces, stretches and strengthening.  - MR-KNEE-W/O RIGHT; Future  - meloxicam (MOBIC) 7.5 MG Tab; Take 1 Tab by mouth 1 time daily as needed for Moderate Pain, Severe Pain or Inflammation. Take with food.  Dispense: 90 Tab; Refill: 1  - Diclofenac Sodium 1 % Gel; Apply 2 g to skin as directed 3 times a day as needed.  Dispense: 350 g; Refill: 2    2. Eczema, unspecified type  -Discussed avoidance of harsh bar soap.  Trial of triamcinolone 1-2 times daily for 1 to 2 weeks.  Discussed skin thinning properties of corticosteroids.  Encouraged emollient lotions.  - triamcinolone acetonide (KENALOG) 0.1 % Cream; AAA twice daily as needed for leg rash  Dispense: 45 g; Refill: 2    3. Family history of colon cancer  -She has never had a colonoscopy and referral was placed for this today, particularly considering her bowel pattern change over the past few years.  Fortunately she is not losing weight unintentionally, having abdominal pain or  hematochezia.    4. Encounter for screening mammogram for breast cancer  - MA-SCREENING MAMMO BILAT W/CAD; Future    5. Screening for colorectal cancer  - REFERRAL TO GI FOR COLONOSCOPY    6. Screen for colon cancer  - REFERRAL TO GI FOR COLONOSCOPY    7. Preventative health care  -Recommend colonoscopy, mammogram and full updated lab panel, then returning for Pap smear and physical when her  comes in November.  - CBC WITH DIFFERENTIAL; Future  - Comp Metabolic Panel; Future  - Lipid Profile; Future  - TSH; Future  -Encouraged her to consider Tdap, pneumonia, shingles and flu vaccines which she would like to think about.    Total face to face time 40 minutes of which over 50% of this visit is spent in counseling, education and outlining a plan of treatment and coordination of care for the above conditions. This included but was not limited to discussion of medication options and potential risks related to the medications, referral and specialty care options. All patient questions were answered

## 2020-10-05 ENCOUNTER — TELEPHONE (OUTPATIENT)
Dept: MEDICAL GROUP | Facility: LAB | Age: 65
End: 2020-10-05

## 2020-10-05 DIAGNOSIS — Z11.59 ENCOUNTER FOR SCREENING FOR OTHER VIRAL DISEASES: ICD-10-CM

## 2020-10-05 NOTE — TELEPHONE ENCOUNTER
Patient called and states that her  works with and eats lunch with a co worker that had tested positive for COVID today. Requesting order for test so they can both return to work. No current symtoms

## 2020-10-07 ENCOUNTER — HOSPITAL ENCOUNTER (OUTPATIENT)
Dept: LAB | Facility: MEDICAL CENTER | Age: 65
End: 2020-10-07
Attending: NURSE PRACTITIONER
Payer: COMMERCIAL

## 2020-10-07 LAB — COVID ORDER STATUS COVID19: NORMAL

## 2020-10-07 PROCEDURE — U0003 INFECTIOUS AGENT DETECTION BY NUCLEIC ACID (DNA OR RNA); SEVERE ACUTE RESPIRATORY SYNDROME CORONAVIRUS 2 (SARS-COV-2) (CORONAVIRUS DISEASE [COVID-19]), AMPLIFIED PROBE TECHNIQUE, MAKING USE OF HIGH THROUGHPUT TECHNOLOGIES AS DESCRIBED BY CMS-2020-01-R: HCPCS

## 2020-10-07 PROCEDURE — C9803 HOPD COVID-19 SPEC COLLECT: HCPCS

## 2020-10-08 ENCOUNTER — TELEPHONE (OUTPATIENT)
Dept: MEDICAL GROUP | Facility: LAB | Age: 65
End: 2020-10-08

## 2020-10-08 LAB
SARS-COV-2 RNA RESP QL NAA+PROBE: NOTDETECTED
SPECIMEN SOURCE: NORMAL

## 2020-10-08 NOTE — TELEPHONE ENCOUNTER
----- Message from YOGI Butts sent at 10/8/2020 11:36 AM PDT -----  Please let Urban and Nette know that covid tests are negative.

## 2020-11-13 ENCOUNTER — HOSPITAL ENCOUNTER (OUTPATIENT)
Dept: RADIOLOGY | Facility: MEDICAL CENTER | Age: 65
End: 2020-11-13
Attending: NURSE PRACTITIONER
Payer: COMMERCIAL

## 2020-11-13 ENCOUNTER — HOSPITAL ENCOUNTER (OUTPATIENT)
Dept: LAB | Facility: MEDICAL CENTER | Age: 65
End: 2020-11-13
Attending: NURSE PRACTITIONER
Payer: COMMERCIAL

## 2020-11-13 DIAGNOSIS — Z00.00 PREVENTATIVE HEALTH CARE: ICD-10-CM

## 2020-11-13 DIAGNOSIS — M17.0 PRIMARY OSTEOARTHRITIS OF BOTH KNEES: ICD-10-CM

## 2020-11-13 DIAGNOSIS — Z12.31 ENCOUNTER FOR SCREENING MAMMOGRAM FOR BREAST CANCER: ICD-10-CM

## 2020-11-13 LAB
ALBUMIN SERPL BCP-MCNC: 4.2 G/DL (ref 3.2–4.9)
ALBUMIN/GLOB SERPL: 1.4 G/DL
ALP SERPL-CCNC: 74 U/L (ref 30–99)
ALT SERPL-CCNC: 16 U/L (ref 2–50)
ANION GAP SERPL CALC-SCNC: 11 MMOL/L (ref 7–16)
AST SERPL-CCNC: 13 U/L (ref 12–45)
BASOPHILS # BLD AUTO: 1 % (ref 0–1.8)
BASOPHILS # BLD: 0.08 K/UL (ref 0–0.12)
BILIRUB SERPL-MCNC: 0.5 MG/DL (ref 0.1–1.5)
BUN SERPL-MCNC: 19 MG/DL (ref 8–22)
CALCIUM SERPL-MCNC: 9.6 MG/DL (ref 8.5–10.5)
CHLORIDE SERPL-SCNC: 103 MMOL/L (ref 96–112)
CHOLEST SERPL-MCNC: 216 MG/DL (ref 100–199)
CO2 SERPL-SCNC: 27 MMOL/L (ref 20–33)
CREAT SERPL-MCNC: 0.84 MG/DL (ref 0.5–1.4)
EOSINOPHIL # BLD AUTO: 0.26 K/UL (ref 0–0.51)
EOSINOPHIL NFR BLD: 3.3 % (ref 0–6.9)
ERYTHROCYTE [DISTWIDTH] IN BLOOD BY AUTOMATED COUNT: 48.8 FL (ref 35.9–50)
FASTING STATUS PATIENT QL REPORTED: NORMAL
GLOBULIN SER CALC-MCNC: 2.9 G/DL (ref 1.9–3.5)
GLUCOSE SERPL-MCNC: 100 MG/DL (ref 65–99)
HCT VFR BLD AUTO: 47.3 % (ref 37–47)
HDLC SERPL-MCNC: 87 MG/DL
HGB BLD-MCNC: 15.4 G/DL (ref 12–16)
IMM GRANULOCYTES # BLD AUTO: 0.02 K/UL (ref 0–0.11)
IMM GRANULOCYTES NFR BLD AUTO: 0.3 % (ref 0–0.9)
LDLC SERPL CALC-MCNC: 113 MG/DL
LYMPHOCYTES # BLD AUTO: 2.25 K/UL (ref 1–4.8)
LYMPHOCYTES NFR BLD: 28.1 % (ref 22–41)
MCH RBC QN AUTO: 30.9 PG (ref 27–33)
MCHC RBC AUTO-ENTMCNC: 32.6 G/DL (ref 33.6–35)
MCV RBC AUTO: 94.8 FL (ref 81.4–97.8)
MONOCYTES # BLD AUTO: 0.65 K/UL (ref 0–0.85)
MONOCYTES NFR BLD AUTO: 8.1 % (ref 0–13.4)
NEUTROPHILS # BLD AUTO: 4.74 K/UL (ref 2–7.15)
NEUTROPHILS NFR BLD: 59.2 % (ref 44–72)
NRBC # BLD AUTO: 0 K/UL
NRBC BLD-RTO: 0 /100 WBC
PLATELET # BLD AUTO: 310 K/UL (ref 164–446)
PMV BLD AUTO: 10.4 FL (ref 9–12.9)
POTASSIUM SERPL-SCNC: 4.8 MMOL/L (ref 3.6–5.5)
PROT SERPL-MCNC: 7.1 G/DL (ref 6–8.2)
RBC # BLD AUTO: 4.99 M/UL (ref 4.2–5.4)
SODIUM SERPL-SCNC: 141 MMOL/L (ref 135–145)
TRIGL SERPL-MCNC: 78 MG/DL (ref 0–149)
TSH SERPL DL<=0.005 MIU/L-ACNC: 1.09 UIU/ML (ref 0.38–5.33)
WBC # BLD AUTO: 8 K/UL (ref 4.8–10.8)

## 2020-11-13 PROCEDURE — 85025 COMPLETE CBC W/AUTO DIFF WBC: CPT

## 2020-11-13 PROCEDURE — 77067 SCR MAMMO BI INCL CAD: CPT

## 2020-11-13 PROCEDURE — 36415 COLL VENOUS BLD VENIPUNCTURE: CPT

## 2020-11-13 PROCEDURE — 84443 ASSAY THYROID STIM HORMONE: CPT

## 2020-11-13 PROCEDURE — 80053 COMPREHEN METABOLIC PANEL: CPT

## 2020-11-13 PROCEDURE — 73721 MRI JNT OF LWR EXTRE W/O DYE: CPT | Mod: RT

## 2020-11-13 PROCEDURE — 80061 LIPID PANEL: CPT

## 2020-11-19 ENCOUNTER — OFFICE VISIT (OUTPATIENT)
Dept: MEDICAL GROUP | Facility: LAB | Age: 65
End: 2020-11-19
Payer: COMMERCIAL

## 2020-11-19 ENCOUNTER — HOSPITAL ENCOUNTER (OUTPATIENT)
Facility: MEDICAL CENTER | Age: 65
End: 2020-11-19
Attending: NURSE PRACTITIONER
Payer: COMMERCIAL

## 2020-11-19 VITALS
BODY MASS INDEX: 32.43 KG/M2 | WEIGHT: 183 LBS | DIASTOLIC BLOOD PRESSURE: 75 MMHG | HEIGHT: 63 IN | HEART RATE: 88 BPM | RESPIRATION RATE: 16 BRPM | TEMPERATURE: 97.3 F | OXYGEN SATURATION: 95 % | SYSTOLIC BLOOD PRESSURE: 130 MMHG

## 2020-11-19 DIAGNOSIS — Z12.4 SCREENING FOR MALIGNANT NEOPLASM OF CERVIX: ICD-10-CM

## 2020-11-19 DIAGNOSIS — Z01.419 ENCOUNTER FOR GYNECOLOGICAL EXAMINATION: ICD-10-CM

## 2020-11-19 DIAGNOSIS — S83.281D ACUTE LATERAL MENISCUS TEAR OF RIGHT KNEE, SUBSEQUENT ENCOUNTER: ICD-10-CM

## 2020-11-19 PROBLEM — S83.281A ACUTE LATERAL MENISCUS TEAR OF RIGHT KNEE: Status: ACTIVE | Noted: 2020-11-19

## 2020-11-19 PROCEDURE — 88175 CYTOPATH C/V AUTO FLUID REDO: CPT

## 2020-11-19 PROCEDURE — 99397 PER PM REEVAL EST PAT 65+ YR: CPT | Performed by: NURSE PRACTITIONER

## 2020-11-19 ASSESSMENT — FIBROSIS 4 INDEX: FIB4 SCORE: 0.68

## 2020-11-19 NOTE — PROGRESS NOTES
CC  Annual exam.    HPI:  Stefany is a 65 y.o.  female who presents for annual exam. Generally the patient is feeling good.  She would like to review her right knee MRI as it hurts her all the time.  MRI shows torn meniscus and moderate arthritis.  Her knee has hurt for years and is worsening.  She is ready to see orthopedics.  Regarding her health maintenance:   Last pap: unsure  Abnormal Pap hx: not that she can recall.  Denies pelvic pain.  menopausal.  No HRT.  Last Mammo:complete.  Denies breast pain.  Last colonoscopy: Scheduled for December 5.  Denies GI issues.  Positive family history of colon cancer.  Bone density test:N\A   Last Lab: completed  Last Td: due but declines  Influenza vaccination:current but declines  Pneumococcal vaccination: Due but declines.  She is a smoker.  shingrix: Due but declines  Hx STD''s: no   Regular exercise: yes      meds:   Current Outpatient Medications   Medication Sig Dispense Refill   • Multiple Vitamin (MULTI-VITAMIN PO) Take  by mouth.     • Ascorbic Acid (VITAMIN C) 1000 MG Tab Take  by mouth.     • Magnesium 300 MG Cap Take  by mouth.     • B Complex Vitamins (VITAMIN B COMPLEX PO) Take  by mouth.     • vitamin e (VITAMIN E) 400 UNIT Cap Take 400 Units by mouth every day.     • Potassium (POTASSIMIN PO) Take  by mouth.     • meloxicam (MOBIC) 7.5 MG Tab Take 1 Tab by mouth 1 time daily as needed for Moderate Pain, Severe Pain or Inflammation. Take with food. 90 Tab 1   • Diclofenac Sodium 1 % Gel Apply 2 g to skin as directed 3 times a day as needed. 350 g 2   • triamcinolone acetonide (KENALOG) 0.1 % Cream AAA twice daily as needed for leg rash 45 g 2     No current facility-administered medications for this visit.        Allergies: No Known Allergies    family:   Family History   Problem Relation Age of Onset   • Heart Disease Mother    • Cancer Mother         colon   • Cancer Father         prostate   • Cancer Brother         lung - smoker       social hx:   Social  History     Socioeconomic History   • Marital status: Single     Spouse name: Not on file   • Number of children: Not on file   • Years of education: Not on file   • Highest education level: Not on file   Occupational History   • Not on file   Social Needs   • Financial resource strain: Not on file   • Food insecurity     Worry: Not on file     Inability: Not on file   • Transportation needs     Medical: Not on file     Non-medical: Not on file   Tobacco Use   • Smoking status: Current Every Day Smoker     Start date: 9/2/1973   • Smokeless tobacco: Never Used   • Tobacco comment: less than 1 ppd.  quit for 10 yrs in her 30's   Substance and Sexual Activity   • Alcohol use: Yes     Alcohol/week: 8.4 oz     Types: 14 Shots of liquor per week   • Drug use: Never   • Sexual activity: Not on file     Comment: ; three kids; wk:  [employer]; born: [place]   Lifestyle   • Physical activity     Days per week: Not on file     Minutes per session: Not on file   • Stress: Not on file   Relationships   • Social connections     Talks on phone: Not on file     Gets together: Not on file     Attends Faith service: Not on file     Active member of club or organization: Not on file     Attends meetings of clubs or organizations: Not on file     Relationship status: Not on file   • Intimate partner violence     Fear of current or ex partner: Not on file     Emotionally abused: Not on file     Physically abused: Not on file     Forced sexual activity: Not on file   Other Topics Concern   • Not on file   Social History Narrative   • Not on file       ROS:  No fever, chills, sweats.   No polydipsia, polyuria, temperature intolerance, significant weight changes   No visual changes, blurred vision.  No chest pain, palpitations, peripheral swelling   No chronic cough, shortness of breath, dyspnea with exertion.   No dysphagia, odynophagia, black or bloody stools.   No abdominal pain, nausea, persistent diarrhea, constipation  "  No dysuria, hematuria, incontinence. Denies nocturia  No rash, pruritis, pigment changes.   No focal weakness, syncope, headache, confusion, persistent numbness.   All other systems are reviewed and negative.    PHYSICAL EXAMINATION:  /75   Pulse 88   Temp 36.3 °C (97.3 °F)   Resp 16   Ht 1.6 m (5' 3\")   Wt 83 kg (183 lb)   SpO2 95%       General appearance:healthy, well developed, well nourished  Psych: alert, no distress, cooperative  Eyes: EOM's normal, pupils equal, round, reactive to light  ENT: Ears: external ears normal to inspection and palpation, TM's clear, Nose/Sinuses: nose shows no deformity, asymmetry, or inflammation  Neck: no asymmetry, masses, or scars, no adenopathy, thyroid normal to palpation  Lungs:chest symmetric with normal A/P diameter, no chest deformities noted, normal respiratory rate and rhythm  Cardiovascular:regular rate and rhythm, S1 normal  Breasts: normal in size and symmetry, skin normal, physiologic fibronodularity  Abdomen: umbilicus normal, no masses palpable, no organomegaly  Musculoskeletal:ROM of all joints is normal, no evidence of joint instability  Lymphatic: None significantly enlarged  Skin: no rash, no edema  Neuro: mental status intact, cranial nerves 2-12 intact  Pelvic: external genitalia normal, cervix normal in appearance, bimanual exam reveals normal uterus, adnexa without masses or tenderness, vaginal mucosa normal      ASSESSMENT/PLAN:  1.annual physical exam: HCM:  Pap and breast exams done.  BSE technique reviewed and patient encouraged to perform self-exam monthly.   Encourage monthly self breast exam  Encourage daily exercise for at least 30 minutes  Recommend yearly mammograms.  Colonoscopy scheduled.  Reviewed all labs with her.  Recommend 1500 mg Calcium with 600 units vit d daily.    If this Pap smear is normal, may discontinue having Pap smears.  Strongly encouraged her to cut back her alcohol and to stop smoking.  Encouraged her to " exercise daily.  Referred to orthopedics regarding her knee MRI.

## 2020-11-21 DIAGNOSIS — Z12.4 SCREENING FOR MALIGNANT NEOPLASM OF CERVIX: ICD-10-CM

## 2020-11-21 LAB — CYTOLOGY REG CYTOL: NORMAL

## 2020-11-24 ENCOUNTER — TELEPHONE (OUTPATIENT)
Dept: MEDICAL GROUP | Facility: LAB | Age: 65
End: 2020-11-24

## 2020-11-24 NOTE — TELEPHONE ENCOUNTER
----- Message from YOGI Butts sent at 11/24/2020 12:54 PM PST -----  Please let pt know that pap is normal. Thanks!

## 2021-01-12 ENCOUNTER — OFFICE VISIT (OUTPATIENT)
Dept: MEDICAL GROUP | Facility: LAB | Age: 66
End: 2021-01-12
Payer: COMMERCIAL

## 2021-01-12 ENCOUNTER — HOSPITAL ENCOUNTER (OUTPATIENT)
Dept: LAB | Facility: MEDICAL CENTER | Age: 66
End: 2021-01-12
Attending: NURSE PRACTITIONER
Payer: COMMERCIAL

## 2021-01-12 VITALS
DIASTOLIC BLOOD PRESSURE: 78 MMHG | WEIGHT: 176 LBS | RESPIRATION RATE: 16 BRPM | HEIGHT: 63 IN | TEMPERATURE: 97.6 F | HEART RATE: 86 BPM | BODY MASS INDEX: 31.18 KG/M2 | OXYGEN SATURATION: 96 % | SYSTOLIC BLOOD PRESSURE: 140 MMHG

## 2021-01-12 DIAGNOSIS — R19.5 MUCUS IN STOOL: ICD-10-CM

## 2021-01-12 DIAGNOSIS — M17.0 PRIMARY OSTEOARTHRITIS OF BOTH KNEES: ICD-10-CM

## 2021-01-12 LAB
BASOPHILS # BLD AUTO: 0.7 % (ref 0–1.8)
BASOPHILS # BLD: 0.07 K/UL (ref 0–0.12)
EOSINOPHIL # BLD AUTO: 0.12 K/UL (ref 0–0.51)
EOSINOPHIL NFR BLD: 1.3 % (ref 0–6.9)
ERYTHROCYTE [DISTWIDTH] IN BLOOD BY AUTOMATED COUNT: 46.9 FL (ref 35.9–50)
HCT VFR BLD AUTO: 48.3 % (ref 37–47)
HGB BLD-MCNC: 15.6 G/DL (ref 12–16)
IMM GRANULOCYTES # BLD AUTO: 0.06 K/UL (ref 0–0.11)
IMM GRANULOCYTES NFR BLD AUTO: 0.6 % (ref 0–0.9)
LYMPHOCYTES # BLD AUTO: 2.3 K/UL (ref 1–4.8)
LYMPHOCYTES NFR BLD: 24 % (ref 22–41)
MCH RBC QN AUTO: 30 PG (ref 27–33)
MCHC RBC AUTO-ENTMCNC: 32.3 G/DL (ref 33.6–35)
MCV RBC AUTO: 92.9 FL (ref 81.4–97.8)
MONOCYTES # BLD AUTO: 0.67 K/UL (ref 0–0.85)
MONOCYTES NFR BLD AUTO: 7 % (ref 0–13.4)
NEUTROPHILS # BLD AUTO: 6.38 K/UL (ref 2–7.15)
NEUTROPHILS NFR BLD: 66.4 % (ref 44–72)
NRBC # BLD AUTO: 0 K/UL
NRBC BLD-RTO: 0 /100 WBC
PLATELET # BLD AUTO: 326 K/UL (ref 164–446)
PMV BLD AUTO: 10.5 FL (ref 9–12.9)
RBC # BLD AUTO: 5.2 M/UL (ref 4.2–5.4)
WBC # BLD AUTO: 9.6 K/UL (ref 4.8–10.8)

## 2021-01-12 PROCEDURE — 86140 C-REACTIVE PROTEIN: CPT

## 2021-01-12 PROCEDURE — 82784 ASSAY IGA/IGD/IGG/IGM EACH: CPT

## 2021-01-12 PROCEDURE — 99214 OFFICE O/P EST MOD 30 MIN: CPT | Performed by: NURSE PRACTITIONER

## 2021-01-12 PROCEDURE — 80053 COMPREHEN METABOLIC PANEL: CPT

## 2021-01-12 PROCEDURE — 83516 IMMUNOASSAY NONANTIBODY: CPT

## 2021-01-12 PROCEDURE — 36415 COLL VENOUS BLD VENIPUNCTURE: CPT

## 2021-01-12 PROCEDURE — 85025 COMPLETE CBC W/AUTO DIFF WBC: CPT

## 2021-01-12 RX ORDER — DIPHENHYDRAMINE HCL 25 MG
25 TABLET ORAL EVERY 6 HOURS PRN
COMMUNITY

## 2021-01-12 ASSESSMENT — PATIENT HEALTH QUESTIONNAIRE - PHQ9: CLINICAL INTERPRETATION OF PHQ2 SCORE: 0

## 2021-01-12 ASSESSMENT — FIBROSIS 4 INDEX: FIB4 SCORE: 0.68

## 2021-01-12 NOTE — PROGRESS NOTES
"Chief Complaint   Patient presents with   • Bowel Problem       HPI  Nette is a 65-year-old established female here with 2 concerns:  1.-Diarrhea with mucousy stool: Had colonoscopy 12/2020 and a week or so later - starting having mucus / blood in stool.  Quit ASA in hopes that this would alleviate symptoms - blood resolved but she continues to have copious amounts of mucus in her stool with diarrhea and urgency.  Saw GI today and they ordered stool cultures, stool calprotectin, hpylori, ova / parasites, cbc, cmp, crp, ciliac antibodies.  Also told to stop metamucil and try benefiber.  Pepto-Bismol is helpful for her.  No other associated symptoms such as fevers or chills.  She does have abdominal cramping when she has stool urgency.  Has two cats and a dog at home.       #2-chronic right knee pain, worsening -MRI showed a torn meniscus and moderate osteoarthritis as well as a joint effusion.  She saw Dr. Wolff who recommended that she wait a few years and then have a knee replacement.  She has pain with going up and down stairs as well as getting in and out of chairs and was frustrated with this answer.  She also stands on her feet all day at work.    Past medical, surgical, family, and social history is reviewed and updated in Epic chart by me today.   Medications and allergies reviewed and updated in Epic chart by me today.     ROS:   As documented in history of present illness above    Exam:  /78 (BP Location: Right arm, Patient Position: Sitting, BP Cuff Size: Large adult)   Pulse 86   Temp 36.4 °C (97.6 °F)   Resp 16   Ht 1.6 m (5' 3\")   Wt 79.8 kg (176 lb)   SpO2 96%   Constitutional: Alert, no distress, plus 3 vital signs  Skin:  Warm, dry, no rashes invisible areas  Eye: Equal, round and reactive, conjunctiva clear  ENMT: Lips without lesions   Neck: Trachea midline  Respiratory: Unlabored respiration  Cardiovascular: Normal rate  Abdomen: Soft, nontender  Psych: Alert, pleasant, well-groomed, " normal affect    Assessment / Plan / Medical Decision makin. Primary osteoarthritis of both knees - Dr. Wolff - prolonging replacement as of   -Patient is frustrated with this.  I did give her the name of a few other orthopedic surgeons in town for a second opinion.  Discussed that knee replacements typically only last 10 to 15 years and she should wait on replacement until her knee pain is debilitating.  We discussed the importance of quadricep and hamstring strengthening.    Encouraged her to work on a little bit of weight loss.  We discussed supportive shoes and knee braces as well as topical anti-inflammatories.      2. Mucus in stool  -Fortunately she was able to see GI today.  She wanted confirmation that all tests were ordered that were necessary which I confirmed for her.  She will have her labs drawn in our lobby and collect her stool samples at home as soon as possible.  She will start a probiotic and continue with yogurt intake.  She will change from Metamucil to Benefiber.  If she begins to have a return of hematochezia, she will go the emergency department.

## 2021-01-13 LAB
ALBUMIN SERPL BCP-MCNC: 4.6 G/DL (ref 3.2–4.9)
ALBUMIN/GLOB SERPL: 1.6 G/DL
ALP SERPL-CCNC: 74 U/L (ref 30–99)
ALT SERPL-CCNC: 19 U/L (ref 2–50)
ANION GAP SERPL CALC-SCNC: 12 MMOL/L (ref 7–16)
AST SERPL-CCNC: 17 U/L (ref 12–45)
BILIRUB SERPL-MCNC: 0.7 MG/DL (ref 0.1–1.5)
BUN SERPL-MCNC: 13 MG/DL (ref 8–22)
CALCIUM SERPL-MCNC: 9.8 MG/DL (ref 8.5–10.5)
CHLORIDE SERPL-SCNC: 102 MMOL/L (ref 96–112)
CO2 SERPL-SCNC: 26 MMOL/L (ref 20–33)
CREAT SERPL-MCNC: 0.8 MG/DL (ref 0.5–1.4)
CRP SERPL HS-MCNC: 0.11 MG/DL (ref 0–0.75)
GLOBULIN SER CALC-MCNC: 2.9 G/DL (ref 1.9–3.5)
GLUCOSE SERPL-MCNC: 95 MG/DL (ref 65–99)
POTASSIUM SERPL-SCNC: 4.7 MMOL/L (ref 3.6–5.5)
PROT SERPL-MCNC: 7.5 G/DL (ref 6–8.2)
SODIUM SERPL-SCNC: 140 MMOL/L (ref 135–145)

## 2021-01-14 LAB — IGA SERPL-MCNC: 196 MG/DL (ref 68–408)

## 2021-01-15 LAB — GLIADIN PEPTIDE+TTG IGA+IGG SER QL IA: 8 UNITS (ref 0–19)

## 2021-01-23 ENCOUNTER — HOSPITAL ENCOUNTER (OUTPATIENT)
Facility: MEDICAL CENTER | Age: 66
End: 2021-01-23
Attending: NURSE PRACTITIONER
Payer: COMMERCIAL

## 2021-01-23 LAB
G LAMBLIA+C PARVUM AG STL QL RAPID: NORMAL
H PYLORI AG STL QL IA: NOT DETECTED
SIGNIFICANT IND 70042: NORMAL
SITE SITE: NORMAL
SOURCE SOURCE: NORMAL

## 2021-01-23 PROCEDURE — 87328 CRYPTOSPORIDIUM AG IA: CPT

## 2021-01-23 PROCEDURE — 87899 AGENT NOS ASSAY W/OPTIC: CPT

## 2021-01-23 PROCEDURE — 87338 HPYLORI STOOL AG IA: CPT

## 2021-01-23 PROCEDURE — 83993 ASSAY FOR CALPROTECTIN FECAL: CPT

## 2021-01-23 PROCEDURE — 87329 GIARDIA AG IA: CPT

## 2021-01-23 PROCEDURE — 87045 FECES CULTURE AEROBIC BACT: CPT

## 2021-01-24 LAB
E COLI SXT1+2 STL IA: NORMAL
SIGNIFICANT IND 70042: NORMAL
SITE SITE: NORMAL
SOURCE SOURCE: NORMAL

## 2021-01-25 LAB
BACTERIA STL CULT: NORMAL
C JEJUNI+C COLI AG STL QL: NORMAL
E COLI SXT1+2 STL IA: NORMAL
SIGNIFICANT IND 70042: NORMAL
SITE SITE: NORMAL
SOURCE SOURCE: NORMAL

## 2021-01-26 LAB — CALPROTECTIN STL-MCNT: 43 UG/G

## 2021-07-09 DIAGNOSIS — M17.0 PRIMARY OSTEOARTHRITIS OF BOTH KNEES: ICD-10-CM

## 2021-07-09 RX ORDER — MELOXICAM 7.5 MG/1
TABLET ORAL
Qty: 90 TABLET | Refills: 1 | Status: SHIPPED | OUTPATIENT
Start: 2021-07-09 | End: 2022-08-11 | Stop reason: SDUPTHER

## 2021-07-09 NOTE — TELEPHONE ENCOUNTER
Received request via: Pharmacy    Was the patient seen in the last year in this department? Yes  LOV 01/12/2021  Does the patient have an active prescription (recently filled or refills available) for medication(s) requested? No

## 2022-01-12 ENCOUNTER — OFFICE VISIT (OUTPATIENT)
Dept: MEDICAL GROUP | Facility: LAB | Age: 67
End: 2022-01-12
Payer: COMMERCIAL

## 2022-01-12 VITALS
SYSTOLIC BLOOD PRESSURE: 130 MMHG | BODY MASS INDEX: 33.66 KG/M2 | OXYGEN SATURATION: 97 % | TEMPERATURE: 96.6 F | DIASTOLIC BLOOD PRESSURE: 76 MMHG | RESPIRATION RATE: 16 BRPM | WEIGHT: 190 LBS | HEART RATE: 84 BPM | HEIGHT: 63 IN

## 2022-01-12 DIAGNOSIS — M25.552 LEFT HIP PAIN: ICD-10-CM

## 2022-01-12 DIAGNOSIS — M54.50 CHRONIC BILATERAL LOW BACK PAIN WITHOUT SCIATICA: ICD-10-CM

## 2022-01-12 DIAGNOSIS — G89.29 CHRONIC BILATERAL LOW BACK PAIN WITHOUT SCIATICA: ICD-10-CM

## 2022-01-12 PROCEDURE — 99213 OFFICE O/P EST LOW 20 MIN: CPT | Performed by: NURSE PRACTITIONER

## 2022-01-12 ASSESSMENT — PATIENT HEALTH QUESTIONNAIRE - PHQ9: CLINICAL INTERPRETATION OF PHQ2 SCORE: 0

## 2022-01-12 ASSESSMENT — FIBROSIS 4 INDEX: FIB4 SCORE: 0.79

## 2022-01-12 NOTE — PROGRESS NOTES
"CC  Hip pain      HPI:  Nette is a 67 yo est female here with complaints of left hip and low back pain.  New onset \"excrutiating,\" pains that come and go to left hip x 6 mo.  Pain induced with walking, stairs, lying on left side.  Denies injury or trauma.    Associated muscle tightening.   Denies left leg pain.    Denies back pain.   Denies leg numbness or tingling.   Taking tylenol or ibuprofen for pain as needed.  Heat kind of helps, per pt.    Known severe osteoarthritis of both knees and trying to prolong knee replacements.  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  Has meloxicam but is not taking it regularly.    Exam:  /76 (BP Location: Left arm, Patient Position: Sitting, BP Cuff Size: Large adult)   Pulse 84   Temp 35.9 °C (96.6 °F)   Resp 16   Ht 1.6 m (5' 3\")   Wt 86.2 kg (190 lb)   SpO2 97%   Gen. appears healthy in no distress   Skin appropriate for sex and age   Neck trachea is midline  Lungs unlabored breathing  Heart regular rate  Neuro gait and station normal   Musculoskeletal she does have full range of motion of her left hip and pain is not reproduced with internal or external rotation as well as a lack of pain with flexion of the hip, she does cause a tightness sensation.  I do not hear clicking or popping with hip range of motion.  She does experience some tightness and discomfort with flexion of her lumbar spine although she does not have any lumbar spine bony tenderness, step-offs or overlying bruising.  Gait is a slight limp, favoring her left hip.  Psych appropriate, calm, interactive, well-groomed      A/P:  \"  1. Left hip pain  DX-HIP-COMPLETE - UNILATERAL 2+ LEFT   2. Chronic bilateral low back pain without sciatica  DX-LUMBAR SPINE-2 OR 3 VIEWS   \"  Recommend x-rays of her hip and lumbar spine.  Encouraged her to start meloxicam daily with food and high water intake for the next 2 weeks straight.  She was given several stretches to do twice " daily for the next few weeks.  We discussed heat versus ice.  Discussed intermittent FMLA if needed for work.  She has been to [facility] orth[facility]ics and we discussed visiting her orthopedist there if meloxicam is not helpful, x-rays are unrevealing and pain persist.  Follow-up 2 weeks via email.

## 2022-01-14 ENCOUNTER — HOSPITAL ENCOUNTER (OUTPATIENT)
Dept: RADIOLOGY | Facility: MEDICAL CENTER | Age: 67
End: 2022-01-14
Attending: NURSE PRACTITIONER
Payer: COMMERCIAL

## 2022-01-14 DIAGNOSIS — G89.29 CHRONIC BILATERAL LOW BACK PAIN WITHOUT SCIATICA: ICD-10-CM

## 2022-01-14 DIAGNOSIS — M54.50 CHRONIC BILATERAL LOW BACK PAIN WITHOUT SCIATICA: ICD-10-CM

## 2022-01-14 DIAGNOSIS — M25.552 LEFT HIP PAIN: ICD-10-CM

## 2022-01-14 PROCEDURE — 72100 X-RAY EXAM L-S SPINE 2/3 VWS: CPT

## 2022-01-14 PROCEDURE — 73502 X-RAY EXAM HIP UNI 2-3 VIEWS: CPT | Mod: LT

## 2022-02-03 ENCOUNTER — TELEPHONE (OUTPATIENT)
Dept: MEDICAL GROUP | Facility: LAB | Age: 67
End: 2022-02-03

## 2022-02-03 NOTE — TELEPHONE ENCOUNTER
1. Caller Name: Nette                        Call Back Number: [phone] (home)        How would the patient prefer to be contacted with a response: Phone call OK to leave a detailed message    Pt is asking for a letter for her work stating that she can wear her mask under her nose at work.  She says that she gets dizzy and is afraid to fall.  She is 'getting in trouble at work' because she is not wearing the mask correctly.

## 2022-08-11 ENCOUNTER — OFFICE VISIT (OUTPATIENT)
Dept: MEDICAL GROUP | Facility: LAB | Age: 67
End: 2022-08-11
Payer: COMMERCIAL

## 2022-08-11 VITALS
SYSTOLIC BLOOD PRESSURE: 140 MMHG | WEIGHT: 186 LBS | BODY MASS INDEX: 32.96 KG/M2 | OXYGEN SATURATION: 97 % | DIASTOLIC BLOOD PRESSURE: 76 MMHG | HEIGHT: 63 IN | RESPIRATION RATE: 16 BRPM | TEMPERATURE: 97.7 F | HEART RATE: 82 BPM

## 2022-08-11 DIAGNOSIS — R73.01 IMPAIRED FASTING GLUCOSE: ICD-10-CM

## 2022-08-11 DIAGNOSIS — E78.5 HYPERLIPIDEMIA, UNSPECIFIED HYPERLIPIDEMIA TYPE: ICD-10-CM

## 2022-08-11 DIAGNOSIS — M17.0 PRIMARY OSTEOARTHRITIS OF BOTH KNEES: ICD-10-CM

## 2022-08-11 DIAGNOSIS — M17.11 PRIMARY OSTEOARTHRITIS OF RIGHT KNEE: ICD-10-CM

## 2022-08-11 PROCEDURE — 99214 OFFICE O/P EST MOD 30 MIN: CPT | Performed by: NURSE PRACTITIONER

## 2022-08-11 RX ORDER — MELOXICAM 7.5 MG/1
TABLET ORAL
Qty: 90 TABLET | Refills: 3 | Status: SHIPPED | OUTPATIENT
Start: 2022-08-11 | End: 2023-05-25

## 2022-08-11 ASSESSMENT — FIBROSIS 4 INDEX: FIB4 SCORE: 0.8

## 2022-08-11 NOTE — PROGRESS NOTES
"Chief Complaint   Patient presents with    Knee Pain     HPI:  Nette is a 66 yo est female here with c/o right knee pain: chronic issue.  Worsening. Last MRI 2020 showed moderate OA / popliteal cyst, edema, and complete detachment of medial meniscal root.  Has a lot of pain with all activities of daily living -worsening.  Does find meloxicam helpful and needs a refill.  Denies GI side effects of meloxicam.  She has been told that she has pretty severe left knee osteoarthritis although her left knee is not very bothersome.  We also recently did x-rays of her spine and hip which showed degenerative disc disease and mild arthritis.    Healthcare maintenance: She does have a history of impaired fasting glucose, last blood sugar was 100 fasting 2 years ago.  Overdue for lab work.  Unfortunately still smokes.  Was told at one point that she possibly had gestational diabetes during her pregnancy.    Exam:  BP (!) 140/76 (BP Location: Right arm, Patient Position: Sitting, BP Cuff Size: Large adult)   Pulse 82   Temp 36.5 °C (97.7 °F)   Resp 16   Ht 1.6 m (5' 3\")   Wt 84.4 kg (186 lb)   SpO2 97%   Gen. appears healthy in no distress   Skin appropriate for sex and age   Neck trachea is midline  Lungs unlabored breathing  Heart regular rate  Neuro gait and station normal   Psych appropriate, calm, interactive, well-groomed    A/P:  1. Primary osteoarthritis of right knee  Referral to Orthopedics    meloxicam (MOBIC) 7.5 MG Tab    CANCELED: Referral to Orthopedics      2. Primary osteoarthritis of both knees  meloxicam (MOBIC) 7.5 MG Tab      3. Impaired fasting glucose  HEMOGLOBIN A1C      4. Hyperlipidemia, unspecified hyperlipidemia type  Comp Metabolic Panel    CBC WITH DIFFERENTIAL    Lipid Profile    TSH         Referred to orthopedics for likely need of a knee replacement.  Refilled meloxicam.  Discussed GI and renal precautions with meloxicam.      Recommend a full updated lab panel.  Encouraged continued efforts at " weight loss and a low carbohydrate diet.  As always, encouraged her to stop smoking.  Discussed the importance of smoking cessation to facilitate healing after knee replacement.

## 2022-08-30 ENCOUNTER — HOSPITAL ENCOUNTER (OUTPATIENT)
Dept: LAB | Facility: MEDICAL CENTER | Age: 67
End: 2022-08-30
Attending: NURSE PRACTITIONER
Payer: COMMERCIAL

## 2022-08-30 DIAGNOSIS — R73.01 IMPAIRED FASTING GLUCOSE: ICD-10-CM

## 2022-08-30 DIAGNOSIS — E78.5 HYPERLIPIDEMIA, UNSPECIFIED HYPERLIPIDEMIA TYPE: ICD-10-CM

## 2022-08-30 LAB
ALBUMIN SERPL BCP-MCNC: 4.3 G/DL (ref 3.2–4.9)
ALBUMIN/GLOB SERPL: 1.6 G/DL
ALP SERPL-CCNC: 72 U/L (ref 30–99)
ALT SERPL-CCNC: 14 U/L (ref 2–50)
ANION GAP SERPL CALC-SCNC: 12 MMOL/L (ref 7–16)
AST SERPL-CCNC: 13 U/L (ref 12–45)
BASOPHILS # BLD AUTO: 1 % (ref 0–1.8)
BASOPHILS # BLD: 0.08 K/UL (ref 0–0.12)
BILIRUB SERPL-MCNC: 0.6 MG/DL (ref 0.1–1.5)
BUN SERPL-MCNC: 16 MG/DL (ref 8–22)
CALCIUM SERPL-MCNC: 9.4 MG/DL (ref 8.5–10.5)
CHLORIDE SERPL-SCNC: 102 MMOL/L (ref 96–112)
CHOLEST SERPL-MCNC: 201 MG/DL (ref 100–199)
CO2 SERPL-SCNC: 25 MMOL/L (ref 20–33)
CREAT SERPL-MCNC: 0.85 MG/DL (ref 0.5–1.4)
EOSINOPHIL # BLD AUTO: 0.17 K/UL (ref 0–0.51)
EOSINOPHIL NFR BLD: 2.1 % (ref 0–6.9)
ERYTHROCYTE [DISTWIDTH] IN BLOOD BY AUTOMATED COUNT: 49.1 FL (ref 35.9–50)
EST. AVERAGE GLUCOSE BLD GHB EST-MCNC: 103 MG/DL
FASTING STATUS PATIENT QL REPORTED: NORMAL
GFR SERPLBLD CREATININE-BSD FMLA CKD-EPI: 75 ML/MIN/1.73 M 2
GLOBULIN SER CALC-MCNC: 2.7 G/DL (ref 1.9–3.5)
GLUCOSE SERPL-MCNC: 85 MG/DL (ref 65–99)
HBA1C MFR BLD: 5.2 % (ref 4–5.6)
HCT VFR BLD AUTO: 45.5 % (ref 37–47)
HDLC SERPL-MCNC: 62 MG/DL
HGB BLD-MCNC: 15.1 G/DL (ref 12–16)
IMM GRANULOCYTES # BLD AUTO: 0.02 K/UL (ref 0–0.11)
IMM GRANULOCYTES NFR BLD AUTO: 0.3 % (ref 0–0.9)
LDLC SERPL CALC-MCNC: 114 MG/DL
LYMPHOCYTES # BLD AUTO: 2.05 K/UL (ref 1–4.8)
LYMPHOCYTES NFR BLD: 25.9 % (ref 22–41)
MCH RBC QN AUTO: 30.3 PG (ref 27–33)
MCHC RBC AUTO-ENTMCNC: 33.2 G/DL (ref 33.6–35)
MCV RBC AUTO: 91.4 FL (ref 81.4–97.8)
MONOCYTES # BLD AUTO: 0.5 K/UL (ref 0–0.85)
MONOCYTES NFR BLD AUTO: 6.3 % (ref 0–13.4)
NEUTROPHILS # BLD AUTO: 5.11 K/UL (ref 2–7.15)
NEUTROPHILS NFR BLD: 64.4 % (ref 44–72)
NRBC # BLD AUTO: 0 K/UL
NRBC BLD-RTO: 0 /100 WBC
PLATELET # BLD AUTO: 297 K/UL (ref 164–446)
PMV BLD AUTO: 10.9 FL (ref 9–12.9)
POTASSIUM SERPL-SCNC: 4 MMOL/L (ref 3.6–5.5)
PROT SERPL-MCNC: 7 G/DL (ref 6–8.2)
RBC # BLD AUTO: 4.98 M/UL (ref 4.2–5.4)
SODIUM SERPL-SCNC: 139 MMOL/L (ref 135–145)
TRIGL SERPL-MCNC: 126 MG/DL (ref 0–149)
TSH SERPL DL<=0.005 MIU/L-ACNC: 0.88 UIU/ML (ref 0.38–5.33)
WBC # BLD AUTO: 7.9 K/UL (ref 4.8–10.8)

## 2022-08-30 PROCEDURE — 84443 ASSAY THYROID STIM HORMONE: CPT

## 2022-08-30 PROCEDURE — 85025 COMPLETE CBC W/AUTO DIFF WBC: CPT

## 2022-08-30 PROCEDURE — 83036 HEMOGLOBIN GLYCOSYLATED A1C: CPT

## 2022-08-30 PROCEDURE — 80061 LIPID PANEL: CPT

## 2022-08-30 PROCEDURE — 36415 COLL VENOUS BLD VENIPUNCTURE: CPT

## 2022-08-30 PROCEDURE — 80053 COMPREHEN METABOLIC PANEL: CPT

## 2022-09-01 ENCOUNTER — TELEPHONE (OUTPATIENT)
Dept: MEDICAL GROUP | Facility: LAB | Age: 67
End: 2022-09-01
Payer: COMMERCIAL

## 2022-09-01 NOTE — TELEPHONE ENCOUNTER
----- Message from YOGI Butts sent at 8/31/2022  7:28 PM PDT -----  Please let patient know that her labs look great!

## 2022-09-20 NOTE — TELEPHONE ENCOUNTER
Please let her know that unfortunately I cannot write this letter as that is an inappropriate way to wear her mask and does not protect her or others from Covid.   Price (Use Numbers Only, No Special Characters Or $): 270 Price (Use Numbers Only, No Special Characters Or $): 718

## 2022-09-29 ENCOUNTER — TELEPHONE (OUTPATIENT)
Dept: MEDICAL GROUP | Facility: LAB | Age: 67
End: 2022-09-29

## 2022-09-29 ENCOUNTER — OFFICE VISIT (OUTPATIENT)
Dept: MEDICAL GROUP | Facility: LAB | Age: 67
End: 2022-09-29
Payer: COMMERCIAL

## 2022-09-29 VITALS
DIASTOLIC BLOOD PRESSURE: 78 MMHG | OXYGEN SATURATION: 96 % | WEIGHT: 187 LBS | SYSTOLIC BLOOD PRESSURE: 138 MMHG | BODY MASS INDEX: 33.13 KG/M2 | HEART RATE: 86 BPM | RESPIRATION RATE: 16 BRPM | TEMPERATURE: 96.7 F | HEIGHT: 63 IN

## 2022-09-29 DIAGNOSIS — I51.7 LEFT VENTRICULAR HYPERTROPHY: ICD-10-CM

## 2022-09-29 DIAGNOSIS — Z01.818 PREOPERATIVE CLEARANCE: ICD-10-CM

## 2022-09-29 DIAGNOSIS — F17.200 SMOKER: ICD-10-CM

## 2022-09-29 DIAGNOSIS — E78.49 OTHER HYPERLIPIDEMIA: ICD-10-CM

## 2022-09-29 PROCEDURE — 93000 ELECTROCARDIOGRAM COMPLETE: CPT | Performed by: NURSE PRACTITIONER

## 2022-09-29 PROCEDURE — 99214 OFFICE O/P EST MOD 30 MIN: CPT | Performed by: NURSE PRACTITIONER

## 2022-09-29 ASSESSMENT — FIBROSIS 4 INDEX: FIB4 SCORE: 0.78

## 2022-09-29 NOTE — PROGRESS NOTES
"CC  Preoperative clearance      HPI:  Nette is a 67-year-old established female here for preoperative clearance to have knee replacement - scheduled 10/25/2022 with Dr. Gomez.  Did labs 8/30/2022 - normal CBC, cmp, slightly elevated LDL, and normal thyroid function.    No trouble with anesthesia in the past.    Denies difficulty swallowing.   Smoker - 1/2 ppd.   No dx of sleep apnea or cpap use.  She does snore but mentions right nostril narrowing.    Denies CP or trouble breathing.    Bowels/bladder moving well.  No history of heart attack or stroke.  Still works full-time.  Overall feeling really well and denies any specific complaints or concerns.      Exam:  /78 (BP Location: Left arm, Patient Position: Sitting, BP Cuff Size: Large adult)   Pulse 86   Temp 35.9 °C (96.7 °F)   Resp 16   Ht 1.6 m (5' 3\")   Wt 84.8 kg (187 lb)   SpO2 96%     Well developed, well nourished female in no apparent distress.  Eyes: Conjunctivae and sclerae are clear and non-icteric. Pupils are equally round and reactive to light, extra-ocular movements intact.   ENT: Nares are patent and without discharge. Oropharynx is clear and without erythema or exudates. Buccal mucosa is moist.  Neck: Supple; there is no adenopathy. No supraclavicular adenopathy is noted.  CV: Heart is regular without murmur, rub or gallop.  Pulm: Clear to auscultation.  GI: Abdomen is soft, non-tender,  with normoactive bowel sounds in all four quadrants. No hepatosplenomegaly is appreciated. No masses are palpated. No guarding or rebound is noted.  Psychiatric: The patient is alert and oriented in all four spheres. Mood is euthymic. Affect is appropriate for the situation.  Skin: No rashes were noted.  Musculoskeletal: Gait is normal. Patient is able to transfer from sitting position to exam table without assistance.        A/P:  1. Preoperative clearance  EKG - Clinic Performed        EKG today showing left ventricular hypertrophy with a normal sinus " rhythm of 76.  Reviewed labs which are within normal limits.  Encouraged her to increase fiber, decrease animal fat, stop smoking.  Discussed goal LDL below 90.  Ultimately we should consider a CT cardiac score which we can discuss at her annual physical exam.  Right now she is looking forward to her knee replacement and getting back into physical activity without being in pain.  We discussed pain management's, she will try 500 to 1000 mg of Tylenol up to 3 times daily and avoid more than 1 drink per day when taking that much Tylenol.  Liver function within normal limits on recent CMP.  Letter will be faxed to orthopedics clearing her for surgery.

## 2022-10-19 ENCOUNTER — PRE-ADMISSION TESTING (OUTPATIENT)
Dept: ADMISSIONS | Facility: MEDICAL CENTER | Age: 67
End: 2022-10-19
Attending: ORTHOPAEDIC SURGERY
Payer: COMMERCIAL

## 2022-10-19 DIAGNOSIS — Z01.812 PRE-OPERATIVE LABORATORY EXAMINATION: ICD-10-CM

## 2022-10-19 LAB
ANION GAP SERPL CALC-SCNC: 10 MMOL/L (ref 7–16)
BUN SERPL-MCNC: 16 MG/DL (ref 8–22)
CALCIUM SERPL-MCNC: 9.5 MG/DL (ref 8.4–10.2)
CHLORIDE SERPL-SCNC: 106 MMOL/L (ref 96–112)
CO2 SERPL-SCNC: 26 MMOL/L (ref 20–33)
CREAT SERPL-MCNC: 1.05 MG/DL (ref 0.5–1.4)
ERYTHROCYTE [DISTWIDTH] IN BLOOD BY AUTOMATED COUNT: 46.5 FL (ref 35.9–50)
GFR SERPLBLD CREATININE-BSD FMLA CKD-EPI: 58 ML/MIN/1.73 M 2
GLUCOSE SERPL-MCNC: 108 MG/DL (ref 65–99)
HCT VFR BLD AUTO: 45.7 % (ref 37–47)
HGB BLD-MCNC: 15.2 G/DL (ref 12–16)
MCH RBC QN AUTO: 30.2 PG (ref 27–33)
MCHC RBC AUTO-ENTMCNC: 33.3 G/DL (ref 33.6–35)
MCV RBC AUTO: 90.7 FL (ref 81.4–97.8)
PLATELET # BLD AUTO: 316 K/UL (ref 164–446)
PMV BLD AUTO: 10.3 FL (ref 9–12.9)
POTASSIUM SERPL-SCNC: 4.3 MMOL/L (ref 3.6–5.5)
RBC # BLD AUTO: 5.04 M/UL (ref 4.2–5.4)
SCCMEC + MECA PNL NOSE NAA+PROBE: NEGATIVE
SCCMEC + MECA PNL NOSE NAA+PROBE: NEGATIVE
SODIUM SERPL-SCNC: 142 MMOL/L (ref 135–145)
WBC # BLD AUTO: 8.7 K/UL (ref 4.8–10.8)

## 2022-10-19 PROCEDURE — 85027 COMPLETE CBC AUTOMATED: CPT

## 2022-10-19 PROCEDURE — 80048 BASIC METABOLIC PNL TOTAL CA: CPT

## 2022-10-19 PROCEDURE — 87640 STAPH A DNA AMP PROBE: CPT

## 2022-10-19 PROCEDURE — 36415 COLL VENOUS BLD VENIPUNCTURE: CPT

## 2022-10-19 PROCEDURE — 87641 MR-STAPH DNA AMP PROBE: CPT

## 2022-10-19 ASSESSMENT — FIBROSIS 4 INDEX: FIB4 SCORE: 0.78

## 2022-10-19 NOTE — PREPROCEDURE INSTRUCTIONS
Pre admit apt: Pt. Instructed to continue regularly prescribed medications through day before surgery.  Instructed to take the following medications, the day of surgery, with a sip of water per anesthesia protocol:  none    Pt states no previous issues with anesthesia  METS greater than 4

## 2022-10-20 NOTE — DISCHARGE PLANNING
DISCHARGE PLANNING NOTE - TOTAL JOINT    Procedure: Procedure(s):  RIGHT TOTAL KNEE REPLACEMENT  Procedure Date: 10/25/2022  Insurance: Payor: JOJO / Plan: KIRT FEP / Product Type: *No Product type* /    Equipment currently available at home?   Ice.  Steps into the home? 2 then 1  Steps within the home? 0  Toilet height? Standard  Type of shower? tub-shower  Who will be with you during your recovery? spouse  Is Outpatient Physical Therapy set up after surgery? Yes  Did you take the Total Joint Class and where? Yes  Planning same day discharge?Yes     This writer met with pt during her preadmission cony. Pt educated to preop showers and nasal mrsa screen. Home safety checklist reviewed and copy given to pt. Pt educated to dc criteria. All questions answered and verbalizes understanding of all instructions. No dc needs identified at this time. Anticipate dc to home without barriers.

## 2022-10-25 ENCOUNTER — APPOINTMENT (OUTPATIENT)
Dept: RADIOLOGY | Facility: MEDICAL CENTER | Age: 67
End: 2022-10-25
Attending: ORTHOPAEDIC SURGERY
Payer: COMMERCIAL

## 2022-10-25 ENCOUNTER — HOSPITAL ENCOUNTER (OUTPATIENT)
Facility: MEDICAL CENTER | Age: 67
End: 2022-10-25
Attending: ORTHOPAEDIC SURGERY | Admitting: ORTHOPAEDIC SURGERY
Payer: COMMERCIAL

## 2022-10-25 ENCOUNTER — ANESTHESIA (OUTPATIENT)
Dept: SURGERY | Facility: MEDICAL CENTER | Age: 67
End: 2022-10-25
Payer: COMMERCIAL

## 2022-10-25 ENCOUNTER — ANESTHESIA EVENT (OUTPATIENT)
Dept: SURGERY | Facility: MEDICAL CENTER | Age: 67
End: 2022-10-25
Payer: COMMERCIAL

## 2022-10-25 VITALS
DIASTOLIC BLOOD PRESSURE: 68 MMHG | HEART RATE: 60 BPM | SYSTOLIC BLOOD PRESSURE: 123 MMHG | RESPIRATION RATE: 16 BRPM | WEIGHT: 188.49 LBS | OXYGEN SATURATION: 99 % | TEMPERATURE: 98 F | HEIGHT: 62 IN | BODY MASS INDEX: 34.69 KG/M2

## 2022-10-25 PROBLEM — M17.11 ARTHRITIS OF KNEE, RIGHT: Status: ACTIVE | Noted: 2022-10-25

## 2022-10-25 PROCEDURE — 700111 HCHG RX REV CODE 636 W/ 250 OVERRIDE (IP): Performed by: ANESTHESIOLOGY

## 2022-10-25 PROCEDURE — 700102 HCHG RX REV CODE 250 W/ 637 OVERRIDE(OP): Performed by: ANESTHESIOLOGY

## 2022-10-25 PROCEDURE — 160029 HCHG SURGERY MINUTES - 1ST 30 MINS LEVEL 4: Performed by: ORTHOPAEDIC SURGERY

## 2022-10-25 PROCEDURE — 97165 OT EVAL LOW COMPLEX 30 MIN: CPT

## 2022-10-25 PROCEDURE — 94760 N-INVAS EAR/PLS OXIMETRY 1: CPT

## 2022-10-25 PROCEDURE — 160002 HCHG RECOVERY MINUTES (STAT): Performed by: ORTHOPAEDIC SURGERY

## 2022-10-25 PROCEDURE — 160009 HCHG ANES TIME/MIN: Performed by: ORTHOPAEDIC SURGERY

## 2022-10-25 PROCEDURE — C1713 ANCHOR/SCREW BN/BN,TIS/BN: HCPCS | Performed by: ORTHOPAEDIC SURGERY

## 2022-10-25 PROCEDURE — 64447 NJX AA&/STRD FEMORAL NRV IMG: CPT | Performed by: ORTHOPAEDIC SURGERY

## 2022-10-25 PROCEDURE — 97110 THERAPEUTIC EXERCISES: CPT

## 2022-10-25 PROCEDURE — 700101 HCHG RX REV CODE 250: Performed by: ANESTHESIOLOGY

## 2022-10-25 PROCEDURE — G0378 HOSPITAL OBSERVATION PER HR: HCPCS

## 2022-10-25 PROCEDURE — 700111 HCHG RX REV CODE 636 W/ 250 OVERRIDE (IP): Performed by: ORTHOPAEDIC SURGERY

## 2022-10-25 PROCEDURE — 73560 X-RAY EXAM OF KNEE 1 OR 2: CPT | Mod: RT

## 2022-10-25 PROCEDURE — 01402 ANES OPN/ARTH TOT KNE ARTHRP: CPT | Performed by: ANESTHESIOLOGY

## 2022-10-25 PROCEDURE — 76942 ECHO GUIDE FOR BIOPSY: CPT | Mod: 26 | Performed by: ANESTHESIOLOGY

## 2022-10-25 PROCEDURE — 160035 HCHG PACU - 1ST 60 MINS PHASE I: Performed by: ORTHOPAEDIC SURGERY

## 2022-10-25 PROCEDURE — 160041 HCHG SURGERY MINUTES - EA ADDL 1 MIN LEVEL 4: Performed by: ORTHOPAEDIC SURGERY

## 2022-10-25 PROCEDURE — A9270 NON-COVERED ITEM OR SERVICE: HCPCS | Performed by: ORTHOPAEDIC SURGERY

## 2022-10-25 PROCEDURE — 700102 HCHG RX REV CODE 250 W/ 637 OVERRIDE(OP): Performed by: ORTHOPAEDIC SURGERY

## 2022-10-25 PROCEDURE — A9270 NON-COVERED ITEM OR SERVICE: HCPCS | Performed by: ANESTHESIOLOGY

## 2022-10-25 PROCEDURE — 97535 SELF CARE MNGMENT TRAINING: CPT

## 2022-10-25 PROCEDURE — 700105 HCHG RX REV CODE 258: Performed by: ORTHOPAEDIC SURGERY

## 2022-10-25 PROCEDURE — C1776 JOINT DEVICE (IMPLANTABLE): HCPCS | Performed by: ORTHOPAEDIC SURGERY

## 2022-10-25 PROCEDURE — 64447 NJX AA&/STRD FEMORAL NRV IMG: CPT | Mod: 59,RT | Performed by: ANESTHESIOLOGY

## 2022-10-25 PROCEDURE — 160048 HCHG OR STATISTICAL LEVEL 1-5: Performed by: ORTHOPAEDIC SURGERY

## 2022-10-25 PROCEDURE — 97162 PT EVAL MOD COMPLEX 30 MIN: CPT

## 2022-10-25 PROCEDURE — 502000 HCHG MISC OR IMPLANTS RC 0278: Performed by: ORTHOPAEDIC SURGERY

## 2022-10-25 PROCEDURE — 700101 HCHG RX REV CODE 250: Performed by: ORTHOPAEDIC SURGERY

## 2022-10-25 DEVICE — CEMENT BONE SIMPLEX W/GENTAICIN (10/PK): Type: IMPLANTABLE DEVICE | Site: KNEE | Status: FUNCTIONAL

## 2022-10-25 DEVICE — IMPLANTABLE DEVICE: Type: IMPLANTABLE DEVICE | Site: KNEE | Status: FUNCTIONAL

## 2022-10-25 RX ORDER — POLYETHYLENE GLYCOL 3350 17 G/17G
1 POWDER, FOR SOLUTION ORAL 2 TIMES DAILY PRN
Status: DISCONTINUED | OUTPATIENT
Start: 2022-10-25 | End: 2022-10-25 | Stop reason: HOSPADM

## 2022-10-25 RX ORDER — SODIUM CHLORIDE, SODIUM LACTATE, POTASSIUM CHLORIDE, CALCIUM CHLORIDE 600; 310; 30; 20 MG/100ML; MG/100ML; MG/100ML; MG/100ML
INJECTION, SOLUTION INTRAVENOUS CONTINUOUS
Status: DISCONTINUED | OUTPATIENT
Start: 2022-10-25 | End: 2022-10-25 | Stop reason: HOSPADM

## 2022-10-25 RX ORDER — HYDROMORPHONE HYDROCHLORIDE 1 MG/ML
0.2 INJECTION, SOLUTION INTRAMUSCULAR; INTRAVENOUS; SUBCUTANEOUS
Status: DISCONTINUED | OUTPATIENT
Start: 2022-10-25 | End: 2022-10-25 | Stop reason: HOSPADM

## 2022-10-25 RX ORDER — HYDRALAZINE HYDROCHLORIDE 20 MG/ML
5 INJECTION INTRAMUSCULAR; INTRAVENOUS
Status: DISCONTINUED | OUTPATIENT
Start: 2022-10-25 | End: 2022-10-25 | Stop reason: HOSPADM

## 2022-10-25 RX ORDER — SODIUM CHLORIDE, SODIUM LACTATE, POTASSIUM CHLORIDE, CALCIUM CHLORIDE 600; 310; 30; 20 MG/100ML; MG/100ML; MG/100ML; MG/100ML
INJECTION, SOLUTION INTRAVENOUS CONTINUOUS
Status: ACTIVE | OUTPATIENT
Start: 2022-10-25 | End: 2022-10-25

## 2022-10-25 RX ORDER — HYDROMORPHONE HYDROCHLORIDE 2 MG/ML
INJECTION, SOLUTION INTRAMUSCULAR; INTRAVENOUS; SUBCUTANEOUS PRN
Status: DISCONTINUED | OUTPATIENT
Start: 2022-10-25 | End: 2022-10-25 | Stop reason: SURG

## 2022-10-25 RX ORDER — LIDOCAINE HYDROCHLORIDE 20 MG/ML
INJECTION, SOLUTION EPIDURAL; INFILTRATION; INTRACAUDAL; PERINEURAL PRN
Status: DISCONTINUED | OUTPATIENT
Start: 2022-10-25 | End: 2022-10-25 | Stop reason: SURG

## 2022-10-25 RX ORDER — ROPIVACAINE HYDROCHLORIDE 5 MG/ML
INJECTION, SOLUTION EPIDURAL; INFILTRATION; PERINEURAL
Status: COMPLETED | OUTPATIENT
Start: 2022-10-25 | End: 2022-10-25

## 2022-10-25 RX ORDER — CEFAZOLIN SODIUM 1 G/3ML
2 INJECTION, POWDER, FOR SOLUTION INTRAMUSCULAR; INTRAVENOUS ONCE
Status: DISCONTINUED | OUTPATIENT
Start: 2022-10-25 | End: 2022-10-25 | Stop reason: HOSPADM

## 2022-10-25 RX ORDER — OXYCODONE HCL 5 MG/5 ML
10 SOLUTION, ORAL ORAL
Status: COMPLETED | OUTPATIENT
Start: 2022-10-25 | End: 2022-10-25

## 2022-10-25 RX ORDER — MEPERIDINE HYDROCHLORIDE 25 MG/ML
12.5 INJECTION INTRAMUSCULAR; INTRAVENOUS; SUBCUTANEOUS
Status: DISCONTINUED | OUTPATIENT
Start: 2022-10-25 | End: 2022-10-25 | Stop reason: HOSPADM

## 2022-10-25 RX ORDER — ENEMA 19; 7 G/133ML; G/133ML
1 ENEMA RECTAL
Status: DISCONTINUED | OUTPATIENT
Start: 2022-10-25 | End: 2022-10-25 | Stop reason: HOSPADM

## 2022-10-25 RX ORDER — HYDROMORPHONE HYDROCHLORIDE 1 MG/ML
0.1 INJECTION, SOLUTION INTRAMUSCULAR; INTRAVENOUS; SUBCUTANEOUS
Status: DISCONTINUED | OUTPATIENT
Start: 2022-10-25 | End: 2022-10-25 | Stop reason: HOSPADM

## 2022-10-25 RX ORDER — DIPHENHYDRAMINE HYDROCHLORIDE 50 MG/ML
6.25 INJECTION INTRAMUSCULAR; INTRAVENOUS
Status: DISCONTINUED | OUTPATIENT
Start: 2022-10-25 | End: 2022-10-25 | Stop reason: HOSPADM

## 2022-10-25 RX ORDER — EPINEPHRINE 1 MG/ML(1)
AMPUL (ML) INJECTION
Status: DISCONTINUED | OUTPATIENT
Start: 2022-10-25 | End: 2022-10-25 | Stop reason: HOSPADM

## 2022-10-25 RX ORDER — AMOXICILLIN 250 MG
1 CAPSULE ORAL NIGHTLY
Status: DISCONTINUED | OUTPATIENT
Start: 2022-10-25 | End: 2022-10-25 | Stop reason: HOSPADM

## 2022-10-25 RX ORDER — SODIUM CHLORIDE 9 MG/ML
INJECTION, SOLUTION INTRAVENOUS CONTINUOUS
Status: DISCONTINUED | OUTPATIENT
Start: 2022-10-25 | End: 2022-10-25 | Stop reason: HOSPADM

## 2022-10-25 RX ORDER — HALOPERIDOL 5 MG/ML
1 INJECTION INTRAMUSCULAR
Status: DISCONTINUED | OUTPATIENT
Start: 2022-10-25 | End: 2022-10-25 | Stop reason: HOSPADM

## 2022-10-25 RX ORDER — MIDAZOLAM HYDROCHLORIDE 1 MG/ML
INJECTION INTRAMUSCULAR; INTRAVENOUS PRN
Status: DISCONTINUED | OUTPATIENT
Start: 2022-10-25 | End: 2022-10-25 | Stop reason: HOSPADM

## 2022-10-25 RX ORDER — ONDANSETRON 2 MG/ML
4 INJECTION INTRAMUSCULAR; INTRAVENOUS
Status: DISCONTINUED | OUTPATIENT
Start: 2022-10-25 | End: 2022-10-25 | Stop reason: HOSPADM

## 2022-10-25 RX ORDER — BISACODYL 10 MG
10 SUPPOSITORY, RECTAL RECTAL
Status: DISCONTINUED | OUTPATIENT
Start: 2022-10-25 | End: 2022-10-25 | Stop reason: HOSPADM

## 2022-10-25 RX ORDER — TRANEXAMIC ACID 100 MG/ML
INJECTION, SOLUTION INTRAVENOUS PRN
Status: DISCONTINUED | OUTPATIENT
Start: 2022-10-25 | End: 2022-10-25 | Stop reason: HOSPADM

## 2022-10-25 RX ORDER — VANCOMYCIN HYDROCHLORIDE 1 G/20ML
INJECTION, POWDER, LYOPHILIZED, FOR SOLUTION INTRAVENOUS
Status: COMPLETED | OUTPATIENT
Start: 2022-10-25 | End: 2022-10-25

## 2022-10-25 RX ORDER — IBUPROFEN 400 MG/1
800 TABLET ORAL 3 TIMES DAILY PRN
Status: DISCONTINUED | OUTPATIENT
Start: 2022-10-28 | End: 2022-10-25 | Stop reason: HOSPADM

## 2022-10-25 RX ORDER — DEXAMETHASONE SODIUM PHOSPHATE 4 MG/ML
4 INJECTION, SOLUTION INTRA-ARTICULAR; INTRALESIONAL; INTRAMUSCULAR; INTRAVENOUS; SOFT TISSUE
Status: DISCONTINUED | OUTPATIENT
Start: 2022-10-25 | End: 2022-10-25 | Stop reason: HOSPADM

## 2022-10-25 RX ORDER — TRAMADOL HYDROCHLORIDE 50 MG/1
50 TABLET ORAL EVERY 6 HOURS
Status: DISCONTINUED | OUTPATIENT
Start: 2022-10-25 | End: 2022-10-25 | Stop reason: HOSPADM

## 2022-10-25 RX ORDER — DIPHENHYDRAMINE HYDROCHLORIDE 50 MG/ML
25 INJECTION INTRAMUSCULAR; INTRAVENOUS EVERY 6 HOURS PRN
Status: DISCONTINUED | OUTPATIENT
Start: 2022-10-25 | End: 2022-10-25 | Stop reason: HOSPADM

## 2022-10-25 RX ORDER — SODIUM CHLORIDE 9 MG/ML
INJECTION, SOLUTION INTRAMUSCULAR; INTRAVENOUS; SUBCUTANEOUS
Status: DISCONTINUED | OUTPATIENT
Start: 2022-10-25 | End: 2022-10-25 | Stop reason: HOSPADM

## 2022-10-25 RX ORDER — ACETAMINOPHEN 500 MG
1000 TABLET ORAL EVERY 6 HOURS PRN
Status: DISCONTINUED | OUTPATIENT
Start: 2022-10-25 | End: 2022-10-25 | Stop reason: HOSPADM

## 2022-10-25 RX ORDER — KETOROLAC TROMETHAMINE 30 MG/ML
INJECTION, SOLUTION INTRAMUSCULAR; INTRAVENOUS
Status: DISCONTINUED | OUTPATIENT
Start: 2022-10-25 | End: 2022-10-25 | Stop reason: HOSPADM

## 2022-10-25 RX ORDER — HYDROMORPHONE HYDROCHLORIDE 1 MG/ML
0.4 INJECTION, SOLUTION INTRAMUSCULAR; INTRAVENOUS; SUBCUTANEOUS
Status: DISCONTINUED | OUTPATIENT
Start: 2022-10-25 | End: 2022-10-25 | Stop reason: HOSPADM

## 2022-10-25 RX ORDER — HALOPERIDOL 5 MG/ML
1 INJECTION INTRAMUSCULAR EVERY 6 HOURS PRN
Status: DISCONTINUED | OUTPATIENT
Start: 2022-10-25 | End: 2022-10-25 | Stop reason: HOSPADM

## 2022-10-25 RX ORDER — DOCUSATE SODIUM 100 MG/1
100 CAPSULE, LIQUID FILLED ORAL 2 TIMES DAILY
Status: DISCONTINUED | OUTPATIENT
Start: 2022-10-25 | End: 2022-10-25 | Stop reason: HOSPADM

## 2022-10-25 RX ORDER — AMOXICILLIN 250 MG
1 CAPSULE ORAL
Status: DISCONTINUED | OUTPATIENT
Start: 2022-10-25 | End: 2022-10-25 | Stop reason: HOSPADM

## 2022-10-25 RX ORDER — ROPIVACAINE HYDROCHLORIDE 5 MG/ML
INJECTION, SOLUTION EPIDURAL; INFILTRATION; PERINEURAL
Status: DISCONTINUED | OUTPATIENT
Start: 2022-10-25 | End: 2022-10-25 | Stop reason: HOSPADM

## 2022-10-25 RX ORDER — MORPHINE SULFATE 0.5 MG/ML
INJECTION, SOLUTION EPIDURAL; INTRATHECAL; INTRAVENOUS
Status: DISCONTINUED | OUTPATIENT
Start: 2022-10-25 | End: 2022-10-25 | Stop reason: HOSPADM

## 2022-10-25 RX ORDER — SCOLOPAMINE TRANSDERMAL SYSTEM 1 MG/1
1 PATCH, EXTENDED RELEASE TRANSDERMAL
Status: DISCONTINUED | OUTPATIENT
Start: 2022-10-25 | End: 2022-10-25 | Stop reason: HOSPADM

## 2022-10-25 RX ORDER — OXYCODONE HCL 5 MG/5 ML
5 SOLUTION, ORAL ORAL
Status: COMPLETED | OUTPATIENT
Start: 2022-10-25 | End: 2022-10-25

## 2022-10-25 RX ORDER — ACETAMINOPHEN 500 MG
1000 TABLET ORAL EVERY 6 HOURS
Status: DISCONTINUED | OUTPATIENT
Start: 2022-10-25 | End: 2022-10-25 | Stop reason: HOSPADM

## 2022-10-25 RX ORDER — ACETAMINOPHEN 500 MG
1000 TABLET ORAL EVERY 6 HOURS PRN
Status: DISCONTINUED | OUTPATIENT
Start: 2022-10-30 | End: 2022-10-25 | Stop reason: HOSPADM

## 2022-10-25 RX ORDER — ONDANSETRON 2 MG/ML
4 INJECTION INTRAMUSCULAR; INTRAVENOUS EVERY 4 HOURS PRN
Status: DISCONTINUED | OUTPATIENT
Start: 2022-10-25 | End: 2022-10-25 | Stop reason: HOSPADM

## 2022-10-25 RX ORDER — KETOROLAC TROMETHAMINE 30 MG/ML
15 INJECTION, SOLUTION INTRAMUSCULAR; INTRAVENOUS EVERY 6 HOURS
Status: DISCONTINUED | OUTPATIENT
Start: 2022-10-25 | End: 2022-10-25 | Stop reason: HOSPADM

## 2022-10-25 RX ORDER — ONDANSETRON 2 MG/ML
INJECTION INTRAMUSCULAR; INTRAVENOUS PRN
Status: DISCONTINUED | OUTPATIENT
Start: 2022-10-25 | End: 2022-10-25 | Stop reason: SURG

## 2022-10-25 RX ORDER — CEFAZOLIN SODIUM 1 G/3ML
INJECTION, POWDER, FOR SOLUTION INTRAMUSCULAR; INTRAVENOUS PRN
Status: DISCONTINUED | OUTPATIENT
Start: 2022-10-25 | End: 2022-10-25 | Stop reason: SURG

## 2022-10-25 RX ORDER — DEXAMETHASONE SODIUM PHOSPHATE 4 MG/ML
INJECTION, SOLUTION INTRA-ARTICULAR; INTRALESIONAL; INTRAMUSCULAR; INTRAVENOUS; SOFT TISSUE PRN
Status: DISCONTINUED | OUTPATIENT
Start: 2022-10-25 | End: 2022-10-25 | Stop reason: SURG

## 2022-10-25 RX ADMIN — TRANEXAMIC ACID 1000 MG: 100 INJECTION, SOLUTION INTRAVENOUS at 11:02

## 2022-10-25 RX ADMIN — LIDOCAINE HYDROCHLORIDE 0.5 ML: 10 INJECTION, SOLUTION EPIDURAL; INFILTRATION; INTRACAUDAL; PERINEURAL at 10:32

## 2022-10-25 RX ADMIN — ONDANSETRON 4 MG: 2 INJECTION INTRAMUSCULAR; INTRAVENOUS at 11:44

## 2022-10-25 RX ADMIN — ROPIVACAINE HYDROCHLORIDE 17 ML: 5 INJECTION, SOLUTION EPIDURAL; INFILTRATION; PERINEURAL at 10:31

## 2022-10-25 RX ADMIN — EPHEDRINE SULFATE 10 MG: 50 INJECTION, SOLUTION INTRAVENOUS at 11:05

## 2022-10-25 RX ADMIN — MIDAZOLAM HYDROCHLORIDE 2 MG: 1 INJECTION, SOLUTION INTRAMUSCULAR; INTRAVENOUS at 10:31

## 2022-10-25 RX ADMIN — HYDROMORPHONE HYDROCHLORIDE 0.5 MG: 2 INJECTION INTRAMUSCULAR; INTRAVENOUS; SUBCUTANEOUS at 10:58

## 2022-10-25 RX ADMIN — PROPOFOL 200 MG: 10 INJECTION, EMULSION INTRAVENOUS at 10:58

## 2022-10-25 RX ADMIN — EPHEDRINE SULFATE 10 MG: 50 INJECTION, SOLUTION INTRAVENOUS at 11:50

## 2022-10-25 RX ADMIN — SODIUM CHLORIDE, POTASSIUM CHLORIDE, SODIUM LACTATE AND CALCIUM CHLORIDE: 600; 310; 30; 20 INJECTION, SOLUTION INTRAVENOUS at 10:54

## 2022-10-25 RX ADMIN — OXYCODONE HYDROCHLORIDE 10 MG: 5 SOLUTION ORAL at 12:41

## 2022-10-25 RX ADMIN — DEXAMETHASONE SODIUM PHOSPHATE 8 MG: 4 INJECTION, SOLUTION INTRA-ARTICULAR; INTRALESIONAL; INTRAMUSCULAR; INTRAVENOUS; SOFT TISSUE at 11:02

## 2022-10-25 RX ADMIN — ACETAMINOPHEN 1000 MG: 500 TABLET ORAL at 17:18

## 2022-10-25 RX ADMIN — TRAMADOL HYDROCHLORIDE 50 MG: 50 TABLET, COATED ORAL at 14:45

## 2022-10-25 RX ADMIN — CEFAZOLIN 2 G: 330 INJECTION, POWDER, FOR SOLUTION INTRAMUSCULAR; INTRAVENOUS at 10:58

## 2022-10-25 RX ADMIN — LIDOCAINE HYDROCHLORIDE 60 MG: 20 INJECTION, SOLUTION EPIDURAL; INFILTRATION; INTRACAUDAL at 10:58

## 2022-10-25 RX ADMIN — TRANEXAMIC ACID 1000 MG: 100 INJECTION, SOLUTION INTRAVENOUS at 11:46

## 2022-10-25 RX ADMIN — HYDROMORPHONE HYDROCHLORIDE 0.5 MG: 2 INJECTION INTRAMUSCULAR; INTRAVENOUS; SUBCUTANEOUS at 11:11

## 2022-10-25 ASSESSMENT — LIFESTYLE VARIABLES
TOTAL SCORE: 0
CONSUMPTION TOTAL: NEGATIVE
AVERAGE NUMBER OF DAYS PER WEEK YOU HAVE A DRINK CONTAINING ALCOHOL: 5
EVER FELT BAD OR GUILTY ABOUT YOUR DRINKING: NO
HAVE PEOPLE ANNOYED YOU BY CRITICIZING YOUR DRINKING: NO
HAVE YOU EVER FELT YOU SHOULD CUT DOWN ON YOUR DRINKING: NO
ALCOHOL_USE: YES
TOTAL SCORE: 0
EVER HAD A DRINK FIRST THING IN THE MORNING TO STEADY YOUR NERVES TO GET RID OF A HANGOVER: NO
TOTAL SCORE: 0
ON A TYPICAL DAY WHEN YOU DRINK ALCOHOL HOW MANY DRINKS DO YOU HAVE: 1
HOW MANY TIMES IN THE PAST YEAR HAVE YOU HAD 5 OR MORE DRINKS IN A DAY: 0

## 2022-10-25 ASSESSMENT — COGNITIVE AND FUNCTIONAL STATUS - GENERAL
SUGGESTED CMS G CODE MODIFIER MOBILITY: CJ
STANDING UP FROM CHAIR USING ARMS: A LITTLE
MOVING TO AND FROM BED TO CHAIR: A LITTLE
DAILY ACTIVITIY SCORE: 23
DRESSING REGULAR LOWER BODY CLOTHING: A LITTLE
MOVING FROM LYING ON BACK TO SITTING ON SIDE OF FLAT BED: A LITTLE
SUGGESTED CMS G CODE MODIFIER DAILY ACTIVITY: CI
CLIMB 3 TO 5 STEPS WITH RAILING: A LITTLE
HELP NEEDED FOR BATHING: A LITTLE
WALKING IN HOSPITAL ROOM: A LITTLE
MOVING FROM LYING ON BACK TO SITTING ON SIDE OF FLAT BED: A LITTLE
HELP NEEDED FOR BATHING: A LITTLE
SUGGESTED CMS G CODE MODIFIER DAILY ACTIVITY: CJ
TOILETING: A LITTLE
WALKING IN HOSPITAL ROOM: A LITTLE
DAILY ACTIVITIY SCORE: 21
CLIMB 3 TO 5 STEPS WITH RAILING: A LITTLE
TURNING FROM BACK TO SIDE WHILE IN FLAT BAD: A LITTLE
MOBILITY SCORE: 18
STANDING UP FROM CHAIR USING ARMS: A LITTLE
SUGGESTED CMS G CODE MODIFIER MOBILITY: CK
MOBILITY SCORE: 20

## 2022-10-25 ASSESSMENT — PAIN DESCRIPTION - PAIN TYPE
TYPE: CHRONIC PAIN
TYPE: SURGICAL PAIN
TYPE: SURGICAL PAIN

## 2022-10-25 ASSESSMENT — GAIT ASSESSMENTS
GAIT LEVEL OF ASSIST: SUPERVISED
DEVIATION: ANTALGIC;STEP TO
ASSISTIVE DEVICE: FRONT WHEEL WALKER
DISTANCE (FEET): 15
DISTANCE (FEET): 100

## 2022-10-25 ASSESSMENT — FIBROSIS 4 INDEX: FIB4 SCORE: 0.74

## 2022-10-25 ASSESSMENT — PATIENT HEALTH QUESTIONNAIRE - PHQ9
2. FEELING DOWN, DEPRESSED, IRRITABLE, OR HOPELESS: NOT AT ALL
1. LITTLE INTEREST OR PLEASURE IN DOING THINGS: NOT AT ALL
SUM OF ALL RESPONSES TO PHQ9 QUESTIONS 1 AND 2: 0

## 2022-10-25 ASSESSMENT — ACTIVITIES OF DAILY LIVING (ADL): TOILETING: INDEPENDENT

## 2022-10-25 NOTE — OR NURSING
1040   Pt allergies and NPO status verified.  Home medications reconciled.  Belongings secured.  Pt verbalizes understanding of pain scale, expected course of stay, and plan of care.  Surgical site verified with pt.  IV access established.  Triple AIM completed. All questions answered.  Bed in low position.  Call light in reach.

## 2022-10-25 NOTE — DISCHARGE PLANNING
note:  Received notification from GENIA Perales that pt needs a walker.   CM called pt and got choice for [facility]. Choice faxed to [facility]. RN to obtain walker from inhouse supply.

## 2022-10-25 NOTE — ANESTHESIA TIME REPORT
Anesthesia Start and Stop Event Times     Date Time Event    10/25/2022 1033 Ready for Procedure     1054 Anesthesia Start     1213 Anesthesia Stop        Responsible Staff  10/25/22    Name Role Begin End    Ben Vaughan M.D. Anesth 1054 1213        Overtime Reason:  no overtime (within assigned shift)    Comments:

## 2022-10-25 NOTE — DISCHARGE INSTRUCTIONS
"Peripheral Nerve Block Discharge Instructions from Same Day Surgery and Inpatient :    What to Expect - Lower Extremity  The block may cause you to experience numbness and weakness in your thigh and knee or calf and foot on the same side as your surgery  Numbness, tingling and / or weakness are all normal. For some people, this may be an unpleasant sensation  These issues will be resolved when the local anesthetic wears off   You may experience numbness and tingling in your thigh on the same side as your surgery if the block medicine was injected at your groin area  Numbness will make it difficult to walk  You may have problems with balance and walking so be very careful   Follow your surgeon's direction regarding weight bearing on your surgical limb  Be very careful with your numb limb  Precautions  The numbness may affect your balance  Be careful when walking or moving around  Your leg may be weak: be very careful putting weight on it  If your surgeon did not specify a time, you should not bear weight for 24 hours  Be sure to ask for help when you need it  It is better to have help than to fall and hurt yourself  Prevent Injury  Protect the limb like a baby  Beware of exposing your limb to extreme heat or cold or trauma  The limb may be injured without you noticing because it is numb  Keep the limb elevated whenever possible  Do not sleep on the limb  Change the position of the limb regularly  Avoid putting pressure on your surgical limb  Pain Control  The initial block on the day of surgery will make your extremity feel \"numb\"  Any consecutive injection including prior to discharge from the hospital will make your extremity feel \"numb\"  You may feel an aching or burning when the local anesthesia starts to wear off  Take pain pills as prescribed by your surgeon  Call your surgeon or anesthesiologist if you do not have adequate pain control    ACTIVITY: Rest and take it easy for the first 24 hours.  A responsible " adult is recommended to remain with you during that time.  It is normal to feel sleepy.  We encourage you to not do anything that requires balance, judgment or coordination.    MILD FLU-LIKE SYMPTOMS ARE NORMAL. YOU MAY EXPERIENCE GENERALIZED MUSCLE ACHES, THROAT IRRITATION, HEADACHE AND/OR SOME NAUSEA.    FOR 24 HOURS DO NOT:  Drive, operate machinery or run household appliances.  Drink beer or alcoholic beverages.   Make important decisions or sign legal documents.    SPECIAL INSTRUCTIONS: see attached printed instructions from Dr Gomez    Use incentive spirometer as instructed at least 10x/hour every hour you are awake     DIET: To avoid nausea, slowly advance diet as tolerated, avoiding spicy or greasy foods for the first day.  Add more substantial food to your diet according to your physician's instructions.  Babies can be fed formula or breast milk as soon as they are hungry.  INCREASE FLUIDS AND FIBER TO AVOID CONSTIPATION.    FOLLOW-UP APPOINTMENT:  A follow-up appointment should be arranged with your doctor; call to schedule.    You should CALL YOUR PHYSICIAN if you develop:  Fever greater than 101 degrees F.  Pain not relieved by medication, or persistent nausea or vomiting.  Excessive bleeding (blood soaking through dressing) or unexpected drainage from the wound.  Extreme redness or swelling around the incision site, drainage of pus or foul smelling drainage.  Inability to urinate or empty your bladder within 8 hours.  Problems with breathing or chest pain.    You should call 911 if you develop problems with breathing or chest pain.  If you are unable to contact your doctor or surgical center, you should go to the nearest emergency room or urgent care center.  Physician's telephone #: [phone]    If any questions arise, call your doctor.  If your doctor is not available, please feel free to call the Surgical Center at [phone].     A registered nurse may call you a few days after your surgery to  see how you are doing after your procedure.    MEDICATIONS: Resume taking daily medication.  Take prescribed pain medication with food.  If no medication is prescribed, you may take non-aspirin pain medication if needed.  PAIN MEDICATION CAN BE VERY CONSTIPATING.  Take a stool softener or laxative such as senokot, pericolace, or milk of magnesia if needed.    Prescription given pre-op.  Last pain medication given at 12:41 p.m.    If your physician has prescribed pain medication that includes Acetaminophen (Tylenol), do not take additional Acetaminophen (Tylenol) while taking the prescribed medication.

## 2022-10-25 NOTE — PROGRESS NOTES
4 Eyes Skin Assessment Completed by GENIA Hutchinson and GENIA Perales.    Head WDL  Ears WDL  Nose WDL  Mouth WDL  Neck WDL  Breast/Chest WDL  Shoulder Blades WDL  Spine WDL  (R) Arm/Elbow/Hand WDL  (L) Arm/Elbow/Hand WDL  Abdomen WDL  Groin WDL  Scrotum/Coccyx/Buttocks WDL  (R) Leg Incision  (L) Leg WDL  (R) Heel/Foot/Toe WDL  (L) Heel/Foot/Toe WDL          Devices In Places Blood Pressure Cuff, Pulse Ox, and SCD's      Interventions In Place N/A    Possible Skin Injury No    Pictures Uploaded Into Epic N/A  Wound Consult Placed N/A  RN Wound Prevention Protocol Ordered No

## 2022-10-25 NOTE — OR SURGEON
Immediate Post OP Note    PreOp Diagnosis: right knee oa      PostOp Diagnosis: same       Procedure(s):  RIGHT TOTAL KNEE REPLACEMENT - Wound Class: Clean    Surgeon(s):  Roge Gomez M.D.    Anesthesiologist/Type of Anesthesia:  Anesthesiologist: Ben Vaughan M.D./General    Surgical Staff:  Assistant: Astrid Varghese CFA  Circulator: Nubia Jiménez R.N.  Limb Jenkins: Tay Cook R.N.  Relief Circulator: Dada Jama  Scrub Person: Bria Carrera    Specimens removed if any:  * No specimens in log *    Estimated Blood Loss: minimal [id number]    Findings: right knee oa    Complications: no apparent         10/25/2022 12:18 PM Roge Gomez M.D.

## 2022-10-25 NOTE — ANESTHESIA PROCEDURE NOTES
Airway    Date/Time: 10/25/2022 10:58 AM  Performed by: Ben Vaughan M.D.  Authorized by: Ben Vaughan M.D.     Location:  OR  Urgency:  Elective  Difficult Airway: No    Indications for Airway Management:  Anesthesia      Spontaneous Ventilation: absent    Sedation Level:  Deep  Preoxygenated: Yes    Mask Difficulty Assessment:  0 - not attempted  Final Airway Type:  Supraglottic airway  Final Supraglottic Airway:  Standard LMA    SGA Size:  4  Number of Attempts at Approach:  1

## 2022-10-25 NOTE — ANESTHESIA PROCEDURE NOTES
Peripheral Block    Date/Time: 10/25/2022 10:31 AM  Performed by: Ben Vaughan M.D.  Authorized by: Bne Vaughan M.D.     Patient Location:  Pre-op  Start Time:  10/25/2022 10:31 AM  End Time:  10/25/2022 10:33 AM  Reason for Block: at surgeon's request and post-op pain management ONLY    patient identified, IV checked, site marked, risks and benefits discussed, surgical consent, monitors and equipment checked, pre-op evaluation and timeout performed    Patient Position:  Supine  Prep: ChloraPrep    Monitoring:  Heart rate, continuous pulse ox and cardiac monitor  Block Region:  Lower Extremity  Lower Extremity - Block Type:  Selective FEMORAL nerve block at the Adductor Canal    Laterality:  Right  Procedures: ultrasound guided  Image captured, interpreted and electronically stored.  Local Infiltration:  Lidocaine  Strength:  1 %  Dose:  3 ml  Block Type:  Single-shot  Needle Localization:  Ultrasound guidance  Injection Assessment:  Negative aspiration for heme, no paresthesia on injection, incremental injection and local visualized surrounding nerve on ultrasound  Evidence of intravascular injection: No     US Guided Selective Femoral Nerve Block at Adductor Canal:   US probe placed at mid-thigh level on externally rotated leg and femur identified.  Probe directed medially until Sartorius Muscle (SM), Femoral Artery (FA) and Saphenous Nerve (SN) identified in Adductor Canal (AC).  Needle inserted anterolateral to probe in an in plane approach into a subsartorial perivascular perineural position.  After negative aspiration LA injected with ease and visualized spreading within the AC.

## 2022-10-25 NOTE — ANESTHESIA POSTPROCEDURE EVALUATION
Patient: Stefany Thornton    Procedure Summary     Date: 10/25/22 Room / Location:  OR 04 / SURGERY [facility]    Anesthesia Start: 1054 Anesthesia Stop: 1213    Procedure: RIGHT TOTAL KNEE REPLACEMENT (Right: Knee) Diagnosis: (OSTEOARTHRITIS OF KNEE)    Surgeons: Roge Gomez M.D. Responsible Provider: Ben Vaughan M.D.    Anesthesia Type: general, peripheral nerve block ASA Status: 2          Final Anesthesia Type: general, peripheral nerve block  Last vitals  BP   Blood Pressure : (!) 157/83, NIBP: 119/71    Temp   36.4 °C (97.5 °F)    Pulse   65   Resp   16    SpO2   95 %      Anesthesia Post Evaluation    Patient location during evaluation: PACU  Patient participation: complete - patient participated  Level of consciousness: awake and alert    Airway patency: patent  Anesthetic complications: no  Cardiovascular status: hemodynamically stable  Respiratory status: acceptable  Hydration status: euvolemic    PONV: none          No notable events documented.     Nurse Pain Score: 6 (NPRS)

## 2022-10-25 NOTE — OP REPORT
DATE OF SERVICE:  10/25/2022     PREOPERATIVE DIAGNOSIS:  Right knee end-stage osteoarthritis.     POSTOPERATIVE DIAGNOSIS:  Right knee end-stage osteoarthritis.     PROCEDURE:  Right total knee arthroplasty.     IMPLANTS USED:  ConforMIS imprint total knee replacement with a tibia size 3,   femur size 3, a 32x8 patella and a size 7 insert, posterior stabilized 10.1 mm   medial, 11.6 mm lateral.     SURGEON:  Roge Gomez MD     ASSISTANT:  Astrid Varghese, certified first assist.     ANESTHESIA:  General block and local.     ANESTHESIOLOGIST:  Ben Vaughan MD     BLOOD LOSS:  Minimal.     TOURNIQUET TIME:  37 minutes.     The patient received 1 gram of TXA prior to and after the case.     COMPLICATIONS:  No apparent.     DISPOSITION:  PACU.     CONDITION:  Stable.     INDICATIONS:  The patient is a 67-year-old female with right knee end-stage   osteoarthritis, who has failed conservative measures including injections,   activity modifications, over-the-counter pain medication, weight loss and   physical therapy, continued knee pain and affecting quality of life.  We   discussed risks, benefits, rationale implant choice.  Risks include but not   limited to infection, neurovascular injury, incomplete relief of symptoms,   DVT, PE, cardiac arrest, complications of anesthesia, potential need for   further surgery.  Informed consent was signed and placed in the chart.  All of   her questions were answered.  No guarantees implied or given.     TECHNIQUE: Both the patient and I agreed the correct operative extremity.  The   right knee was signed and marked in preoperative holding.  She was given 2 g   IV Ancef prophylaxis.  I consulted Dr. Ben Vaughan of anesthesia ____   regarding perioperative pain management.  He discussed with the patient and   consented her for an adductor canal block done under sterile technique without   complication.  The patient was brought to the operative suite.  After   adequate  anesthesia, time-out was taken by everybody present to identify the   correct patient and procedure.  Tourniquet was placed.  Right leg was   sterilely prepped and draped in standard fashion.  Limb was exsanguinated and   tourniquet inflated to 250 mmHg pressure.  Midline incision was made followed   by medial parapatellar arthrotomy.  Findings were tricompartmental   osteoarthritis with exposed bone and osteophytes.  The patient-specific F1   guide was placed followed by a distal F2 guide.  The distal femur was cut.    Patient-specific proximal tibial guide was placed.  Proximal tibia was cut.    This was measured.  Gap balancing was correct.  The femur was then proceeded   with the F4 block for anterior and posterior cuts, chamfer cuts and a   posterior stabilized knee with a box cut for the PCL.  Posterior osteophytes   were removed from the medial and lateral femur.  Local anesthetic was placed   in the posterior compartment.  Trialing with a size 7 insert was appropriate   in flexion and extension.  Patella was cut, flushed and measured and replaced   with an all poly patella. Tibia was then prepped.  Bone was irrigated with   pulsatile lavage. At the back table, cement with antibiotics was mixed.  Final   implants were placed.  Cement was allowed to harden.  Tourniquet was let down   and hemostasis achieved.  The knee was irrigated with dilute Betadine   solution, which was allowed to dry, followed by 3 liter of sterile saline.    Vancomycin powder was placed deep.  Arthrotomy was closed with a running Quill   followed by figure-of-eight #1 Vicryl, 2-0 Vicryl for subcutaneous tissue,   staples for the skin and Aquacel silver based dressing was placed.  The   patient received a second gram of TXA after the tourniquet was let down.  Soft   dressing was placed.  The patient was transferred to recovery in stable   condition.  Counts were correct.  No apparent complications.  In recovery, the   patient was able to  dorsiflex and plantarflex, was intact to light sensory   touch with good pedal pulse.     Astrid Varghese, certified first assist, was essential for successful   completion of the case.  She assisted with positioning and implantation.  The   case could not have been done without her.        ______________________________  MD GIL Bruce/BRITNI/RYLAN    DD:  10/25/2022 12:30  DT:  10/25/2022 14:30    Job#:  [id number]

## 2022-10-25 NOTE — ANESTHESIA PREPROCEDURE EVALUATION
Case: [id number] Date/Time: 10/25/22 1045    Procedure: RIGHT TOTAL KNEE REPLACEMENT    Pre-op diagnosis: OSTEOARTHRITIS OF KNEE    Location: [facility] OR 04 / SURGERY [facility]    Surgeons: Roge Gomez M.D.          Relevant Problems   Other   (positive) Left ventricular hypertrophy   (positive) Primary osteoarthritis of both knees    (positive) Smoker       Physical Exam    Airway   Mallampati: II  TM distance: >3 FB  Neck ROM: full       Cardiovascular - normal exam  Rhythm: regular  Rate: normal  (-) murmur     Dental - normal exam           Pulmonary - normal exam  Breath sounds clear to auscultation     Abdominal    Neurological - normal exam                 Anesthesia Plan    ASA 2       Plan - general and peripheral nerve block     Peripheral nerve block will be post-op pain control  Airway plan will be LMA          Induction: intravenous    Postoperative Plan: Postoperative administration of opioids is intended.    Pertinent diagnostic labs and testing reviewed    Informed Consent:    Anesthetic plan and risks discussed with patient.

## 2022-10-25 NOTE — OR NURSING
1211: To PACU from OR via [facility],  sleeping, respirations spontaneous and non-labored via OPA.  Icepack applied over c/d/i R knee surgical dressings. R DP+2, R toes pink/warm with brisk CRF.  1225: Rouses spontaneously, denies pain, c/o dry throat, commenced po juice, pt verbalizes desire for d/c home today so O2 d/maria l and plan to transfer to Stage 2 when pt meets criteria       1240: Plan analgesia for onset surgical pain  1255: Given IS and instructed on use with goal of 1750cc, pt currently inhaling volumes of approx 1500cc  1302: Xrays in progress  1310: Pt c/o persistent decreased sensation to R foot and unable to march in place when stands with 2 person assist. Returned to [facility] and plan to transfer to [facility] for further recovery and PT later in afternoon.  1311: Meets criteria to transfer to floor. O2 resumed per ERAS orders.  1322: spouse updated with [facility] room #  1340: Dozing intermittently, verbalizes comfort when awake  1400: Transferred on O2 tank @ 429L

## 2022-10-26 NOTE — THERAPY
Occupational Therapy   Initial Evaluation     Patient Name: Stefany Thornton  Age:  67 y.o., Sex:  female  Medical Record #: [id number]  Today's Date: 10/25/2022     Precautions  Precautions: Weight Bearing As Tolerated Right Lower Extremity    Assessment  Patient is 67 y.o. female with a diagnosis of Right TKA.  Additional factors influencing patient status / progress: Right knee pain/discomfort.  Patient and  reside in a [facility] in [place], [place].   will able able to assist as needed.  PLOF Indep for ADL's/transfers + Indep functional mobility w/out device.  Therapist reviewed/educated patient and  on point protection, environmental/safety awareness, fall precautions, AE/DME, ADL's and transfers.  No further skilled occupational therapy recommended at this time.    Plan    Recommend Occupational Therapy for Evaluation only.    DC Equipment Recommendations: (P) Tub / Shower Seat  Discharge Recommendations: (P) Anticipate that the patient will have no further occupational therapy needs after discharge from the hospital     Subjective    Patient was alert, oriented and cooperative w/ tx.     Objective       10/25/22 1624   Prior Living Situation   Prior Services Home-Independent   Housing / Facility 1 Story House   Steps Into Home 2   Steps In Home 0   Rail None   Bathroom Set up Bathtub / Shower Combination;Shower Curtain   Equipment Owned None   Lives with - Patient's Self Care Capacity Spouse   Prior Level of ADL Function   Self Feeding Independent   Grooming / Hygiene Independent   Bathing Independent   Dressing Independent   Toileting Independent   Prior Level of IADL Function   Medication Management Independent   Laundry Independent   Kitchen Mobility Independent   Finances Independent   Home Management Independent   Shopping Independent   Prior Level Of Mobility Independent Without Device in Community;Independent With Steps in Community;Independent Without Device in Home;Independent With Steps in  Home   Driving / Transportation Driving Independent   History of Falls   History of Falls No   Precautions   Precautions Weight Bearing As Tolerated Right Lower Extremity   Pain   Intervention Cold Pack;Repositioned   Pain 0 - 10 Group   Location Knee   Location Orientation Right   Description Aching   Therapist Pain Assessment 2;Post Activity Pain Same as Prior to Activity   Non Verbal Descriptors   Non Verbal Scale  Calm;Unlabored Breathing   Cognition    Cognition / Consciousness WDL   Level of Consciousness Alert   Active ROM Upper Body   Active ROM Upper Body  WDL   Dominant Hand Right   Strength Upper Body   Upper Body Strength  WDL   Upper Body Muscle Tone   Upper Body Muscle Tone  WDL   Coordination Upper Body   Coordination WDL   Balance Assessment   Sitting Balance (Static) Good   Sitting Balance (Dynamic) Good   Standing Balance (Static) Fair +   Standing Balance (Dynamic) Fair +   Weight Shift Sitting Good   Weight Shift Standing Fair   Comments FWW   Bed Mobility    Supine to Sit Modified Independent   Sit to Supine Modified Independent   ADL Assessment   Upper Body Dressing Independent   Lower Body Dressing Modified Independent   Toileting Modified Independent   How much help from another person does the patient currently need...   Putting on and taking off regular lower body clothing? 4   Bathing (including washing, rinsing, and drying)? 3   Toileting, which includes using a toilet, bedpan, or urinal? 4   Putting on and taking off regular upper body clothing? 4   Taking care of personal grooming such as brushing teeth? 4   Eating meals? 4   6 Clicks Daily Activity Score 23   Functional Mobility   Sit to Stand Supervised   Bed, Chair, Wheelchair Transfer Supervised   Toilet Transfers Supervised   Transfer Method Stand Step   Mobility Sup FWW, EOB<>commode + sink   Distance (Feet) 15   # of Times Distance was Traveled 2   Edema / Skin Assessment   Edema / Skin  Not Assessed   Activity Tolerance    Sitting Edge of Bed 15   Standing 5x2   Comments sitting/commode 5   Education Group   Education Provided Joint Protection;Transfers;Role of Occupational Therapist;Activities of Daily Living;Adaptive Equipment   Role of Occupational Therapist Patient Response Patient;Acceptance;Explanation;Verbal Demonstration;Significant Other   Joint Protection Patient Response Patient;Acceptance;Explanation;Demonstration;Verbal Demonstration;Action Demonstration;Significant Other   Transfers Patient Response Patient;Acceptance;Explanation;Demonstration;Verbal Demonstration;Action Demonstration;Significant Other   ADL Patient Response Patient;Significant Other;Acceptance;Explanation;Demonstration;Verbal Demonstration;Action Demonstration   Adaptive Equipment Patient Response Patient;Significant Other;Acceptance;Explanation;Demonstration;Verbal Demonstration;Action Demonstration   Anticipated Discharge Equipment and Recommendations   DC Equipment Recommendations Tub / Shower Seat   Discharge Recommendations Anticipate that the patient will have no further occupational therapy needs after discharge from the hospital

## 2022-10-26 NOTE — DISCHARGE SUMMARY
DATE OF ADMISSION:  10/25/2022   DATE OF DISCHARGE:  10/25/2022     ADMIT DIAGNOSIS:  Right knee end-stage osteoarthritis.     PROCEDURE PERFORMED:  Right total knee arthroplasty.     HOSPITAL COURSE:  The patient was cleared for discharge home by physical   therapy with a normal motor, sensory neurovascular exam.  Plan was aspirin for   DVT prophylaxis, Tylenol for baseline pain, tramadol for breakthrough pain   and oxycodone for severe pain.  Start with physical therapy.  Follow up in 2   weeks' time.        ______________________________  MD GIL Bruce/RAF    DD:  10/25/2022 17:49  DT:  10/25/2022 20:16    Job#:  [id number]

## 2022-10-26 NOTE — THERAPY
Physical Therapy   Initial Evaluation     Patient Name: Stefany Thornton  Age:  67 y.o., Sex:  female  Medical Record #: [id number]  Today's Date: 10/25/2022     Precautions  Precautions: Weight Bearing As Tolerated Right Lower Extremity    Assessment  Patient is 67 y.o. female with a diagnosis of right TKA, WBAT by Dr Gomez. Pt presenting with ROM 0-80 degrees, pain well controlled at this time at 3/10. Pt educated and performed RLE post TKA exercises, issued handout. Pt demonstrated the ability to safely and timely ambulate for functional home distances with FWW. Pt will need FWW for home use. RN notified.     Plan    Recommend Physical Therapy for Evaluation only and HEP     DC Equipment Recommendations: Front-Wheel Walker  Discharge Recommendations: Recommend outpatient physical therapy services to address higher level deficits          Objective       10/25/22 1646   Active ROM Lower Body    Active ROM Lower Body  X   RT Knee Flexion Degrees 80   RT Knee Extension Degrees 0   Strength Lower Body   Comments able to perform SLR   Supine Lower Body Exercise   Supine Lower Body Exercises Yes   Straight Leg Raises 1 set of 10   Short Arc Quad 1 set of 10   Heel Slide 1 set of 10   Ankle Pumps 2 sets of 10   Quadriceps Isometrics 1 set of 10   Comments 5 sec hold   Sitting Lower Body Exercises   Sitting Lower Body Exercises Yes   Other Exercises heel slide x 10 ROM 70 degrees   Balance   Sitting Balance (Static) Good   Sitting Balance (Dynamic) Good   Standing Balance (Static) Fair +   Standing Balance (Dynamic) Fair +   Weight Shift Sitting Good   Weight Shift Standing Fair   Gait Analysis   Gait Level Of Assist Supervised   Assistive Device Front Wheel Walker   Distance (Feet) 100   # of Times Distance was Traveled 1   Deviation Antalgic;Step To   # of Stairs Climbed 1   Level of Assist with Stairs Standby Assist   Weight Bearing Status WBAT   Bed Mobility    Supine to Sit Independent   Sit to Supine Independent    Scooting Independent   Rolling Independent   Functional Mobility   Sit to Stand Supervised   Bed, Chair, Wheelchair Transfer Supervised   Transfer Method Stand Step   Education Group   Education Provided Role of Physical Therapist;Gait Training;Stair Training   Anticipated Discharge Equipment and Recommendations   DC Equipment Recommendations Front-Wheel Walker

## 2023-05-25 ENCOUNTER — OFFICE VISIT (OUTPATIENT)
Dept: MEDICAL GROUP | Facility: LAB | Age: 68
End: 2023-05-25
Payer: COMMERCIAL

## 2023-05-25 VITALS
WEIGHT: 199 LBS | OXYGEN SATURATION: 96 % | TEMPERATURE: 96.4 F | RESPIRATION RATE: 16 BRPM | SYSTOLIC BLOOD PRESSURE: 144 MMHG | DIASTOLIC BLOOD PRESSURE: 84 MMHG | HEIGHT: 62 IN | BODY MASS INDEX: 36.62 KG/M2 | HEART RATE: 108 BPM

## 2023-05-25 DIAGNOSIS — M17.0 PRIMARY OSTEOARTHRITIS OF BOTH KNEES: ICD-10-CM

## 2023-05-25 DIAGNOSIS — Z13.820 SCREENING FOR OSTEOPOROSIS: ICD-10-CM

## 2023-05-25 DIAGNOSIS — G47.9 SLEEP DISTURBANCE: ICD-10-CM

## 2023-05-25 DIAGNOSIS — Z00.00 PREVENTATIVE HEALTH CARE: ICD-10-CM

## 2023-05-25 DIAGNOSIS — E78.00 ELEVATED LDL CHOLESTEROL LEVEL: ICD-10-CM

## 2023-05-25 DIAGNOSIS — M19.041 LOCALIZED OSTEOARTHRITIS OF HANDS, BILATERAL: ICD-10-CM

## 2023-05-25 DIAGNOSIS — R06.83 SNORING: ICD-10-CM

## 2023-05-25 DIAGNOSIS — M19.042 LOCALIZED OSTEOARTHRITIS OF HANDS, BILATERAL: ICD-10-CM

## 2023-05-25 PROBLEM — S83.281A ACUTE LATERAL MENISCUS TEAR OF RIGHT KNEE: Status: RESOLVED | Noted: 2020-11-19 | Resolved: 2023-05-25

## 2023-05-25 PROBLEM — M17.11 ARTHRITIS OF KNEE, RIGHT: Status: RESOLVED | Noted: 2022-10-25 | Resolved: 2023-05-25

## 2023-05-25 PROCEDURE — 99214 OFFICE O/P EST MOD 30 MIN: CPT | Performed by: NURSE PRACTITIONER

## 2023-05-25 PROCEDURE — 3079F DIAST BP 80-89 MM HG: CPT | Performed by: NURSE PRACTITIONER

## 2023-05-25 PROCEDURE — 3077F SYST BP >= 140 MM HG: CPT | Performed by: NURSE PRACTITIONER

## 2023-05-25 RX ORDER — MELOXICAM 15 MG/1
TABLET ORAL
COMMUNITY
Start: 2023-05-17 | End: 2024-01-18

## 2023-05-25 RX ORDER — GABAPENTIN 300 MG/1
300-600 CAPSULE ORAL
Qty: 180 CAPSULE | Refills: 2 | Status: SHIPPED | OUTPATIENT
Start: 2023-05-25 | End: 2024-01-08

## 2023-05-25 ASSESSMENT — FIBROSIS 4 INDEX: FIB4 SCORE: 0.75

## 2023-05-25 ASSESSMENT — PATIENT HEALTH QUESTIONNAIRE - PHQ9: CLINICAL INTERPRETATION OF PHQ2 SCORE: 0

## 2023-05-25 NOTE — PROGRESS NOTES
"Chief Complaint   Patient presents with    Medication Management       HPI:  Nette is a 68-year-old established female here to follow-up on chronic issues.  Since I have seen her last, she has had a right total knee replacement with Dr. Gomez which went well.  Last lab work was prior to surgery in October and before that August for her annual labs, showing A1c of 5.2, total cholesterol 201/, normal CBC/CMP.  She is due for a bone density, shingles vaccine and mammogram.  Cologuard will be due in December.  She does admit that she has a few episodes of chest pain per year and has for many years - denies chest pain today.   She continues to smoke and has for most of her adult life although she quit for 15 years a while ago.  She has no interest in help with smoking cessation today.  She has not had a CT cardiac score.  She does snore.     She does have difficulty sleeping and was previously taking Benadryl but heard that it could potentially worsen memory and she has changed over to using gabapentin which helps her and she is requesting a prescription.  She suffers from chronic pain in her knees, hands and feet.  Works with her hands all day.  Denies negative side effects of gabapentin.    Exam:  BP (!) 144/84 (BP Location: Right arm, Patient Position: Sitting, BP Cuff Size: Large adult)   Pulse (!) 108   Temp (!) 35.8 °C (96.4 °F)   Resp 16   Ht 1.575 m (5' 2\")   Wt 90.3 kg (199 lb)   SpO2 96%   Gen: NAD  Resp: nonlabored.  Able to speak in full sentences  CV regular rhythm.  Slightly tachycardic around 105.  No murmur.  Psy: pleasant / cooperative.   Neuro:  Alert and oriented x 3      A/P:  1. Localized osteoarthritis of hands, bilateral        2. Sleep disturbance        3. Preventative health care  Lipid Profile    HEMOGLOBIN A1C    TSH    Comp Metabolic Panel    CBC WITH DIFFERENTIAL    MA-SCREENING MAMMO BILAT W/TOMOSYNTHESIS W/CAD      4. Screening for osteoporosis  DS-BONE DENSITY STUDY (DEXA)    "   5. Elevated LDL cholesterol level  CT-CARDIAC SCORING      6. Primary osteoarthritis of both knees         Recommend CT cardiac score ASAP, likely followed by stress test and statin therapy.  Strongly encouraged her to stop smoking and at this point she has no desire to do that.  I did encourage her to start checking her blood pressure at home and she states that she will, letting me know if readings are consistently greater than 135/85.  Encouraged her to start a regular exercise program, lose some weight and lower the salt in her diet.  Recommend updated labs in August or September.  Recommend updated bone density and mammogram.  Prescribed gabapentin which is helping her sleep and we discussed side effects such as mild weight gain and sedation although it is working well for her.  She continues on meloxicam as well and denies any GI side effects.  She is up-to-date with her orthopedic surgeon and at this point is able to refrain from left knee replacement, right went well.  We discussed the importance of strengthening her legs through regular exercise.  Offered a sleep study as she does snore and she declines this.    Recommend a follow-up after all testing is done.

## 2023-08-11 ENCOUNTER — HOSPITAL ENCOUNTER (OUTPATIENT)
Dept: RADIOLOGY | Facility: MEDICAL CENTER | Age: 68
End: 2023-08-11
Attending: NURSE PRACTITIONER
Payer: COMMERCIAL

## 2023-08-11 DIAGNOSIS — Z00.00 PREVENTATIVE HEALTH CARE: ICD-10-CM

## 2023-08-11 DIAGNOSIS — E78.00 ELEVATED LDL CHOLESTEROL LEVEL: ICD-10-CM

## 2023-08-11 DIAGNOSIS — Z13.820 SCREENING FOR OSTEOPOROSIS: ICD-10-CM

## 2023-08-11 PROCEDURE — 77080 DXA BONE DENSITY AXIAL: CPT

## 2023-08-11 PROCEDURE — 4410556 CT-CARDIAC SCORING (SELF PAY ONLY)

## 2023-08-11 PROCEDURE — 77063 BREAST TOMOSYNTHESIS BI: CPT

## 2023-12-01 ENCOUNTER — HOSPITAL ENCOUNTER (OUTPATIENT)
Dept: LAB | Facility: MEDICAL CENTER | Age: 68
End: 2023-12-01
Attending: NURSE PRACTITIONER
Payer: COMMERCIAL

## 2023-12-01 DIAGNOSIS — Z00.00 PREVENTATIVE HEALTH CARE: ICD-10-CM

## 2023-12-01 LAB
BASOPHILS # BLD AUTO: 1 % (ref 0–1.8)
BASOPHILS # BLD: 0.08 K/UL (ref 0–0.12)
EOSINOPHIL # BLD AUTO: 0.24 K/UL (ref 0–0.51)
EOSINOPHIL NFR BLD: 3 % (ref 0–6.9)
ERYTHROCYTE [DISTWIDTH] IN BLOOD BY AUTOMATED COUNT: 49.4 FL (ref 35.9–50)
EST. AVERAGE GLUCOSE BLD GHB EST-MCNC: 111 MG/DL
HBA1C MFR BLD: 5.5 % (ref 4–5.6)
HCT VFR BLD AUTO: 47.2 % (ref 37–47)
HGB BLD-MCNC: 15.5 G/DL (ref 12–16)
IMM GRANULOCYTES # BLD AUTO: 0.02 K/UL (ref 0–0.11)
IMM GRANULOCYTES NFR BLD AUTO: 0.3 % (ref 0–0.9)
LYMPHOCYTES # BLD AUTO: 2.33 K/UL (ref 1–4.8)
LYMPHOCYTES NFR BLD: 29.2 % (ref 22–41)
MCH RBC QN AUTO: 29.9 PG (ref 27–33)
MCHC RBC AUTO-ENTMCNC: 32.8 G/DL (ref 32.2–35.5)
MCV RBC AUTO: 91.1 FL (ref 81.4–97.8)
MONOCYTES # BLD AUTO: 0.68 K/UL (ref 0–0.85)
MONOCYTES NFR BLD AUTO: 8.5 % (ref 0–13.4)
NEUTROPHILS # BLD AUTO: 4.62 K/UL (ref 1.82–7.42)
NEUTROPHILS NFR BLD: 58 % (ref 44–72)
NRBC # BLD AUTO: 0 K/UL
NRBC BLD-RTO: 0 /100 WBC (ref 0–0.2)
PLATELET # BLD AUTO: 318 K/UL (ref 164–446)
PMV BLD AUTO: 10.7 FL (ref 9–12.9)
RBC # BLD AUTO: 5.18 M/UL (ref 4.2–5.4)
WBC # BLD AUTO: 8 K/UL (ref 4.8–10.8)

## 2023-12-01 PROCEDURE — 80053 COMPREHEN METABOLIC PANEL: CPT

## 2023-12-01 PROCEDURE — 84443 ASSAY THYROID STIM HORMONE: CPT

## 2023-12-01 PROCEDURE — 85025 COMPLETE CBC W/AUTO DIFF WBC: CPT

## 2023-12-01 PROCEDURE — 83036 HEMOGLOBIN GLYCOSYLATED A1C: CPT

## 2023-12-01 PROCEDURE — 36415 COLL VENOUS BLD VENIPUNCTURE: CPT

## 2023-12-01 PROCEDURE — 80061 LIPID PANEL: CPT

## 2023-12-02 LAB
ALBUMIN SERPL BCP-MCNC: 4.4 G/DL (ref 3.2–4.9)
ALBUMIN/GLOB SERPL: 1.5 G/DL
ALP SERPL-CCNC: 85 U/L (ref 30–99)
ALT SERPL-CCNC: 18 U/L (ref 2–50)
ANION GAP SERPL CALC-SCNC: 9 MMOL/L (ref 7–16)
AST SERPL-CCNC: 19 U/L (ref 12–45)
BILIRUB SERPL-MCNC: 0.5 MG/DL (ref 0.1–1.5)
BUN SERPL-MCNC: 17 MG/DL (ref 8–22)
CALCIUM ALBUM COR SERPL-MCNC: 9.2 MG/DL (ref 8.5–10.5)
CALCIUM SERPL-MCNC: 9.5 MG/DL (ref 8.5–10.5)
CHLORIDE SERPL-SCNC: 104 MMOL/L (ref 96–112)
CHOLEST SERPL-MCNC: 208 MG/DL (ref 100–199)
CO2 SERPL-SCNC: 27 MMOL/L (ref 20–33)
CREAT SERPL-MCNC: 0.84 MG/DL (ref 0.5–1.4)
FASTING STATUS PATIENT QL REPORTED: NORMAL
GFR SERPLBLD CREATININE-BSD FMLA CKD-EPI: 75 ML/MIN/1.73 M 2
GLOBULIN SER CALC-MCNC: 2.9 G/DL (ref 1.9–3.5)
GLUCOSE SERPL-MCNC: 94 MG/DL (ref 65–99)
HDLC SERPL-MCNC: 74 MG/DL
LDLC SERPL CALC-MCNC: 117 MG/DL
POTASSIUM SERPL-SCNC: 4.6 MMOL/L (ref 3.6–5.5)
PROT SERPL-MCNC: 7.3 G/DL (ref 6–8.2)
SODIUM SERPL-SCNC: 140 MMOL/L (ref 135–145)
TRIGL SERPL-MCNC: 87 MG/DL (ref 0–149)
TSH SERPL DL<=0.005 MIU/L-ACNC: 1.09 UIU/ML (ref 0.38–5.33)

## 2024-01-08 RX ORDER — GABAPENTIN 300 MG/1
CAPSULE ORAL
Qty: 180 CAPSULE | Refills: 2 | Status: SHIPPED | OUTPATIENT
Start: 2024-01-08

## 2024-01-08 NOTE — TELEPHONE ENCOUNTER
Received request via: Pharmacy    Was the patient seen in the last year in this department? Yes  LOV : 5/25/2023   Does the patient have an active prescription (recently filled or refills available) for medication(s) requested? No    Does the patient have assisted Plus and need 100 day supply (blood pressure, diabetes and cholesterol meds only)? Patient does not have SCP

## 2024-01-18 ENCOUNTER — OFFICE VISIT (OUTPATIENT)
Dept: MEDICAL GROUP | Facility: LAB | Age: 69
End: 2024-01-18
Payer: COMMERCIAL

## 2024-01-18 VITALS
HEIGHT: 62 IN | TEMPERATURE: 96.7 F | WEIGHT: 196 LBS | OXYGEN SATURATION: 96 % | DIASTOLIC BLOOD PRESSURE: 78 MMHG | BODY MASS INDEX: 36.07 KG/M2 | RESPIRATION RATE: 16 BRPM | HEART RATE: 82 BPM | SYSTOLIC BLOOD PRESSURE: 136 MMHG

## 2024-01-18 DIAGNOSIS — Z00.00 WELL ADULT EXAM: ICD-10-CM

## 2024-01-18 DIAGNOSIS — I25.10 CORONARY ARTERY DISEASE DUE TO LIPID RICH PLAQUE: ICD-10-CM

## 2024-01-18 DIAGNOSIS — Z12.11 SCREEN FOR COLON CANCER: ICD-10-CM

## 2024-01-18 DIAGNOSIS — I25.83 CORONARY ARTERY DISEASE DUE TO LIPID RICH PLAQUE: ICD-10-CM

## 2024-01-18 PROCEDURE — 99397 PER PM REEVAL EST PAT 65+ YR: CPT | Performed by: NURSE PRACTITIONER

## 2024-01-18 PROCEDURE — 3078F DIAST BP <80 MM HG: CPT | Performed by: NURSE PRACTITIONER

## 2024-01-18 PROCEDURE — 3075F SYST BP GE 130 - 139MM HG: CPT | Performed by: NURSE PRACTITIONER

## 2024-01-18 RX ORDER — ROSUVASTATIN CALCIUM 10 MG/1
10 TABLET, COATED ORAL EVERY EVENING
Qty: 90 TABLET | Refills: 3 | Status: SHIPPED | OUTPATIENT
Start: 2024-01-18

## 2024-01-18 ASSESSMENT — PATIENT HEALTH QUESTIONNAIRE - PHQ9: CLINICAL INTERPRETATION OF PHQ2 SCORE: 0

## 2024-01-18 ASSESSMENT — FIBROSIS 4 INDEX: FIB4 SCORE: 0.96

## 2024-01-18 NOTE — PROGRESS NOTES
Chief Complaint   Patient presents with    Lab Results    Numbness     In hands X 2-3 months       HPI:  Nette is a 68 y.o. est female who presents for annual exam.   She is a smoker.  She continues to work.  She did well with a right knee replacement last year.  She is due for mammogram and colonoscopy.  Bowels are moving well.  Continues to have what she calls angina which she can self alleviate by taking a deep breath.  She did have a CT cardiac score which was in the 400s and did not read my [employer] message, is not on a statin and has not had a stress test.  She does take 2 baby aspirin's every day.      meds:   Current Outpatient Medications   Medication Sig Dispense Refill    rosuvastatin (CRESTOR) 10 MG Tab Take 1 Tablet by mouth every evening. For cholesterol. 90 Tablet 3    gabapentin (NEURONTIN) 300 MG Cap TAKE 1 TO 2 CAPSULES BY MOUTH AT BEDTIME 180 Capsule 2    diclofenac sodium (VOLTAREN) 1 % Gel APPLY 2 G TOPICALLY 4 TIMES A DAY AS NEEDED (TO ARTHRITIS JOINTS). 300 g 5    aspirin EC (ECOTRIN) 325 MG Tablet Delayed Response Take 325 mg by mouth every day.      Potassium 99 MG Tab Take  by mouth every day.      VITAMIN D PO Take 1,000 Units by mouth every day.      diphenhydrAMINE (BENADRYL) 25 MG Tab Take 25 mg by mouth every 6 hours as needed for Sleep.      Multiple Vitamin (MULTI-VITAMIN PO) Take  by mouth every day.      Ascorbic Acid (VITAMIN C) 1000 MG Tab Take  by mouth every day.      Magnesium 300 MG Cap Take  by mouth every day.      B Complex Vitamins (VITAMIN B COMPLEX PO) Take  by mouth every day.       No current facility-administered medications for this visit.       Allergies: No Known Allergies    family:   Family History   Problem Relation Age of Onset    Heart Disease Mother     Cancer Mother         colon    Cancer Father         prostate    Cancer Brother         lung - smoker       social hx:   Social History     Socioeconomic History    Marital status: Single     Spouse name: Not on  "file    Number of children: Not on file    Years of education: Not on file    Highest education level: Not on file   Occupational History    Not on file   Tobacco Use    Smoking status: Every Day     Current packs/day: 0.50     Average packs/day: 0.5 packs/day for 50.4 years (25.2 ttl pk-yrs)     Types: Cigarettes     Start date: 9/2/1973    Smokeless tobacco: Never    Tobacco comments:     less than 1 ppd.  quit for 10 yrs in her 30's   Vaping Use    Vaping Use: Never used   Substance and Sexual Activity    Alcohol use: Yes     Alcohol/week: 8.4 oz     Types: 14 Shots of liquor per week     Comment: 1-2 per day    Drug use: Never    Sexual activity: Not on file     Comment: ; three kids; wk:  [employer]; born: [place]   Other Topics Concern    Not on file   Social History Narrative    Not on file     Social Determinants of Health     Financial Resource Strain: Not on file   Food Insecurity: Not on file   Transportation Needs: Not on file   Physical Activity: Not on file   Stress: Not on file   Social Connections: Not on file   Intimate Partner Violence: Not on file   Housing Stability: Not on file       ROS:  No fever, chills, sweats.   No polydipsia, polyuria, temperature intolerance, significant weight changes   No visual changes, blurred vision.  No dysphagia, odynophagia, black or bloody stools.   No abdominal pain, nausea, persistent diarrhea, constipation   No dysuria, hematuria, incontinence. Denies nocturia  No rash, pruritis, pigment changes.   No focal weakness, syncope, headache, confusion, persistent numbness.   All other systems are reviewed and negative.    PHYSICAL EXAMINATION:  /78 (BP Location: Right arm, Patient Position: Sitting, BP Cuff Size: Large adult)   Pulse 82   Temp 35.9 °C (96.7 °F)   Resp 16   Ht 1.575 m (5' 2\")   Wt 88.9 kg (196 lb)   SpO2 96%   General appearance:healthy, well developed, well nourished  Psych: alert, no distress, cooperative  Eyes: EOM's normal, " pupils equal, round, reactive to light  ENT: Ears: external ears normal to inspection and palpation, TM's clear, Nose/Sinuses: nose shows no deformity, asymmetry, or inflammation  Neck: no asymmetry, masses, or scars, no adenopathy, thyroid normal to palpation  Lungs:chest symmetric with normal A/P diameter, no chest deformities noted, normal respiratory rate and rhythm  Cardiovascular:regular rate and rhythm, S1 normal  Abdomen: umbilicus normal, no masses palpable, no organomegaly  Musculoskeletal:ROM of all joints is normal, no evidence of joint instability  Lymphatic: None significantly enlarged  Skin: no rash, no edema  Neuro: mental status intact, cranial nerves 2-12 intact      ASSESSMENT/PLAN:  1.annual physical exam: HCM:    Referred for screening colonoscopy and mammogram.  Reviewed all labs from several months ago.  Reviewed CT cardiac score.  She was agreeable to rosuvastatin which was sent to her pharmacy and she will take this nightly.  Discussed the importance of taking rosuvastatin.  She is agreeable to a stress test and feels that she can do a treadmill stress test.   I encouraged her to call 911 if she has angina lasting greater than 3 minutes, especially if not alleviated by deep breathing.  She will continue on aspirin therapy.  Encouraged her to stop smoking.  She is exercising.  She was not having any chest pain today.  Overall tells me that she feels pretty good.    I will follow-up with her immediately after her stress test.  She will stop rosuvastatin if she begins to have systemic/diffuse myalgias which we discussed in depth.    Repeat labs next in 3 months, lipid panel only.    Side effects of all medications prescribed today were discussed with the patient including how to take the medications and proper dosage. Discussed repercussions of not taking the medications as prescribed. Instructed to call the office should she have any negative side effects or problems with the medications.

## 2024-03-08 ENCOUNTER — HOSPITAL ENCOUNTER (OUTPATIENT)
Dept: RADIOLOGY | Facility: MEDICAL CENTER | Age: 69
End: 2024-03-08
Attending: NURSE PRACTITIONER
Payer: COMMERCIAL

## 2024-03-08 DIAGNOSIS — I25.10 CORONARY ARTERY DISEASE DUE TO LIPID RICH PLAQUE: ICD-10-CM

## 2024-03-08 DIAGNOSIS — I25.83 CORONARY ARTERY DISEASE DUE TO LIPID RICH PLAQUE: ICD-10-CM

## 2024-03-08 PROCEDURE — 93017 CV STRESS TEST TRACING ONLY: CPT

## 2024-03-08 PROCEDURE — 93018 CV STRESS TEST I&R ONLY: CPT | Performed by: INTERNAL MEDICINE

## 2024-04-04 ENCOUNTER — TELEPHONE (OUTPATIENT)
Dept: MEDICAL GROUP | Facility: LAB | Age: 69
End: 2024-04-04
Payer: COMMERCIAL

## 2024-05-06 ENCOUNTER — APPOINTMENT (OUTPATIENT)
Dept: MEDICAL GROUP | Facility: LAB | Age: 69
End: 2024-05-06
Payer: COMMERCIAL

## 2024-07-15 RX ORDER — MELOXICAM 15 MG/1
15 TABLET ORAL DAILY
Qty: 30 TABLET | Refills: 3 | Status: SHIPPED | OUTPATIENT
Start: 2024-07-15

## 2024-11-13 ENCOUNTER — HOSPITAL ENCOUNTER (OUTPATIENT)
Dept: LAB | Facility: MEDICAL CENTER | Age: 69
End: 2024-11-13
Attending: NURSE PRACTITIONER
Payer: COMMERCIAL

## 2024-11-13 DIAGNOSIS — I25.10 CORONARY ARTERY DISEASE DUE TO LIPID RICH PLAQUE: ICD-10-CM

## 2024-11-13 DIAGNOSIS — I25.83 CORONARY ARTERY DISEASE DUE TO LIPID RICH PLAQUE: ICD-10-CM

## 2024-11-13 PROCEDURE — 80061 LIPID PANEL: CPT

## 2024-11-13 PROCEDURE — 36415 COLL VENOUS BLD VENIPUNCTURE: CPT

## 2024-11-14 LAB
CHOLEST SERPL-MCNC: 156 MG/DL (ref 100–199)
HDLC SERPL-MCNC: 71 MG/DL
LDLC SERPL CALC-MCNC: 60 MG/DL
TRIGL SERPL-MCNC: 126 MG/DL (ref 0–149)

## 2024-12-09 RX ORDER — GABAPENTIN 300 MG/1
CAPSULE ORAL
Qty: 180 CAPSULE | Refills: 2 | Status: SHIPPED | OUTPATIENT
Start: 2024-12-09

## 2024-12-16 ENCOUNTER — OFFICE VISIT (OUTPATIENT)
Dept: MEDICAL GROUP | Facility: LAB | Age: 69
End: 2024-12-16
Payer: COMMERCIAL

## 2024-12-16 VITALS
TEMPERATURE: 97.7 F | DIASTOLIC BLOOD PRESSURE: 90 MMHG | BODY MASS INDEX: 38.64 KG/M2 | SYSTOLIC BLOOD PRESSURE: 158 MMHG | HEART RATE: 98 BPM | RESPIRATION RATE: 20 BRPM | OXYGEN SATURATION: 95 % | HEIGHT: 62 IN | WEIGHT: 210 LBS

## 2024-12-16 DIAGNOSIS — F17.200 SMOKER: ICD-10-CM

## 2024-12-16 DIAGNOSIS — I25.10 CORONARY ARTERY DISEASE DUE TO LIPID RICH PLAQUE: ICD-10-CM

## 2024-12-16 DIAGNOSIS — I25.83 CORONARY ARTERY DISEASE DUE TO LIPID RICH PLAQUE: ICD-10-CM

## 2024-12-16 DIAGNOSIS — Z00.00 PREVENTATIVE HEALTH CARE: ICD-10-CM

## 2024-12-16 DIAGNOSIS — E78.49 OTHER HYPERLIPIDEMIA: ICD-10-CM

## 2024-12-16 DIAGNOSIS — I10 ESSENTIAL HYPERTENSION: ICD-10-CM

## 2024-12-16 PROCEDURE — 3080F DIAST BP >= 90 MM HG: CPT | Performed by: NURSE PRACTITIONER

## 2024-12-16 PROCEDURE — 3077F SYST BP >= 140 MM HG: CPT | Performed by: NURSE PRACTITIONER

## 2024-12-16 PROCEDURE — 99214 OFFICE O/P EST MOD 30 MIN: CPT | Performed by: NURSE PRACTITIONER

## 2024-12-16 RX ORDER — MELOXICAM 15 MG/1
15 TABLET ORAL DAILY
Qty: 30 TABLET | Refills: 3 | Status: SHIPPED | OUTPATIENT
Start: 2024-12-16

## 2024-12-16 RX ORDER — SEMAGLUTIDE 0.5 MG/.5ML
0.5 INJECTION, SOLUTION SUBCUTANEOUS
Qty: 2 ML | Refills: 3 | Status: SHIPPED | OUTPATIENT
Start: 2024-12-16

## 2024-12-16 RX ORDER — ROSUVASTATIN CALCIUM 10 MG/1
10 TABLET, COATED ORAL EVERY EVENING
Qty: 90 TABLET | Refills: 3 | Status: SHIPPED | OUTPATIENT
Start: 2024-12-16

## 2024-12-16 RX ORDER — LOSARTAN POTASSIUM 25 MG/1
25 TABLET ORAL DAILY
Qty: 90 TABLET | Refills: 3 | Status: SHIPPED | OUTPATIENT
Start: 2024-12-16

## 2024-12-16 ASSESSMENT — FIBROSIS 4 INDEX: FIB4 SCORE: 0.97

## 2024-12-16 NOTE — PATIENT INSTRUCTIONS
Call your insurance and ask if wegovy or zepbound is covered for weight loss in 2025 and if so, through mail order or local pharmacy.  If not - how about saxenda?

## 2024-12-16 NOTE — PROGRESS NOTES
Verbal consent was acquired by the patient to use [employer] ambient listening note generation during this visit Yes      Subjective   Nette Thornton is a 69 y.o. female who presents for f/u  History of Present Illness  The patient is a 69-year-old established female here for a follow-up visit regarding weight, HTN, hyperlipidemia / CAD.  She did loose her  a few months ago to [place].    She reports experiencing fluctuations in her sleep pattern, with some nights being restful and others not. She has been managing her sleep issues with Neurontin, which she finds beneficial. Her appetite varies, with periods of increased hunger followed by days of minimal food intake. She has gained approximately 60 pounds since meeting her partner in 2015, who has now passed away. She expresses interest in semaglutide for weight loss, inspired by her daughter-in-law's success with the medication. She is considering this option, pending insurance coverage. She does not have any family or friends to support her.  She has chronic pain in her left hip and knee and knows that weight loss would help decrease her pain.  She does have an appoint with her orthopedic surgeon, Dr. Gomez, in January regarding a 2-year follow-up for her right knee replacement.    She has a history of hypertension, with home readings typically around 130 systolic although she admits that she really has not checked her blood sugar at home lately. She notes that when her blood pressure drops to 120, she experiences daytime lethargy.    She has known coronary artery disease as evidenced by a CT calcium scoring greater than 400.  Doing well on rosuvastatin.  Recent lipid panel returned excellent.  Negative stress test within the past 12 months.  Very physically active around her house.      Supplemental Information  She takes Imodium twice a day.    Review of Systems  Negative except for HPI  Objective   BP (!) 158/90 (BP Location: Left arm, Patient Position:  "Sitting, BP Cuff Size: Large adult)   Pulse 98   Temp 36.5 °C (97.7 °F)   Resp 20   Ht 1.575 m (5' 2\")   Wt 95.3 kg (210 lb)   SpO2 95%   Physical Exam  Gen. appears healthy in no distress   Skin appropriate for sex and age   Neck trachea is midline  Lungs unlabored breathing  Heart regular rate and rhythm.  Blood pressure recheck by myself was 158/88.  Neuro gait and station normal   Psych appropriate, calm, interactive, well-groomed       Assessment & Plan  \"  1. Other hyperlipidemia  Semaglutide (WEGOVY) 0.5 MG/0.5ML Solution Auto-injector Pen-injector      2. Coronary artery disease due to lipid rich plaque  Semaglutide (WEGOVY) 0.5 MG/0.5ML Solution Auto-injector Pen-injector    rosuvastatin (CRESTOR) 10 MG Tab      3. Smoker        4. BMI 38.0-38.9,adult  Semaglutide (WEGOVY) 0.5 MG/0.5ML Solution Auto-injector Pen-injector      5. Essential hypertension  Semaglutide (WEGOVY) 0.5 MG/0.5ML Solution Auto-injector Pen-injector    losartan (COZAAR) 25 MG Tab      6. Preventative health care  Lipid Profile    TSH    HEMOGLOBIN A1C    Comp Metabolic Panel    CBC WITH DIFFERENTIAL      Nette would benefit from semaglutide to assist in weight loss, especially considering approval for cardiovascular protection with her known coronary artery disease and hypertension.  She will let me know if this is not approved by her insurance.  Discussed how to use semaglutide as well as side effects and the capabilities of increasing the dosage monthly as tolerated.  Discussed constipation prevention.  Discussed symptoms of bowel obstructions.  She does not have a history of pancreatitis or thyroid cancer.  I will see her back here in person in April after she does a full lab panel.  She will continue rosuvastatin.  She was started on losartan 25 mg daily for hypertension.  I encouraged her to stop smoking at this point she has no interest in that.  We discussed a low-salt diet daily physical exercise.  She has worked in " healthcare and feels comfortable with checking her home blood pressure readings.  She will let me know how her home blood pressure is looking over the next 2 weeks with a goal blood pressure reading consistently below 135/85.  She is still grieving.  She declines a referral to a grief counselor.  She is retired.  We discussed the importance of daily physical exercise, healthy eating and seeking out a social support group.         Appointment was made for her to follow-up for an annual exam in April.      Please note that this dictation was created using voice recognition software. I have made every reasonable attempt to correct obvious errors, but I expect that there are errors of grammar and possibly content that I did not discover before finalizing the note.

## 2024-12-17 ENCOUNTER — TELEPHONE (OUTPATIENT)
Dept: MEDICAL GROUP | Facility: LAB | Age: 69
End: 2024-12-17
Payer: COMMERCIAL

## 2024-12-17 NOTE — TELEPHONE ENCOUNTER
Stefany Thornton (Key: [id number])  Rx #: [id number]  Wegovy 0.5MG/0.5ML auto-injectors  Form  [facility] Electronic PA Form (2017 NCPDP)

## 2025-01-09 ENCOUNTER — TELEPHONE (OUTPATIENT)
Dept: MEDICAL GROUP | Facility: LAB | Age: 70
End: 2025-01-09
Payer: COMMERCIAL

## 2025-04-03 ENCOUNTER — HOSPITAL ENCOUNTER (OUTPATIENT)
Dept: LAB | Facility: MEDICAL CENTER | Age: 70
End: 2025-04-03
Attending: NURSE PRACTITIONER
Payer: COMMERCIAL

## 2025-04-03 DIAGNOSIS — Z00.00 PREVENTATIVE HEALTH CARE: ICD-10-CM

## 2025-04-03 LAB
ALBUMIN SERPL BCP-MCNC: 4.5 G/DL (ref 3.2–4.9)
ALBUMIN/GLOB SERPL: 1.5 G/DL
ALP SERPL-CCNC: 79 U/L (ref 30–99)
ALT SERPL-CCNC: 24 U/L (ref 2–50)
ANION GAP SERPL CALC-SCNC: 12 MMOL/L (ref 7–16)
AST SERPL-CCNC: 22 U/L (ref 12–45)
BASOPHILS # BLD AUTO: 0.9 % (ref 0–1.8)
BASOPHILS # BLD: 0.08 K/UL (ref 0–0.12)
BILIRUB SERPL-MCNC: 0.5 MG/DL (ref 0.1–1.5)
BUN SERPL-MCNC: 18 MG/DL (ref 8–22)
CALCIUM ALBUM COR SERPL-MCNC: 9.3 MG/DL (ref 8.5–10.5)
CALCIUM SERPL-MCNC: 9.7 MG/DL (ref 8.5–10.5)
CHLORIDE SERPL-SCNC: 102 MMOL/L (ref 96–112)
CHOLEST SERPL-MCNC: 160 MG/DL (ref 100–199)
CO2 SERPL-SCNC: 23 MMOL/L (ref 20–33)
CREAT SERPL-MCNC: 0.92 MG/DL (ref 0.5–1.4)
EOSINOPHIL # BLD AUTO: 0.18 K/UL (ref 0–0.51)
EOSINOPHIL NFR BLD: 2 % (ref 0–6.9)
ERYTHROCYTE [DISTWIDTH] IN BLOOD BY AUTOMATED COUNT: 49.4 FL (ref 35.9–50)
EST. AVERAGE GLUCOSE BLD GHB EST-MCNC: 111 MG/DL
GFR SERPLBLD CREATININE-BSD FMLA CKD-EPI: 67 ML/MIN/1.73 M 2
GLOBULIN SER CALC-MCNC: 3 G/DL (ref 1.9–3.5)
GLUCOSE SERPL-MCNC: 105 MG/DL (ref 65–99)
HBA1C MFR BLD: 5.5 % (ref 4–5.6)
HCT VFR BLD AUTO: 46.7 % (ref 37–47)
HDLC SERPL-MCNC: 68 MG/DL
HGB BLD-MCNC: 14.8 G/DL (ref 12–16)
IMM GRANULOCYTES # BLD AUTO: 0.04 K/UL (ref 0–0.11)
IMM GRANULOCYTES NFR BLD AUTO: 0.5 % (ref 0–0.9)
LDLC SERPL CALC-MCNC: 72 MG/DL
LYMPHOCYTES # BLD AUTO: 2.35 K/UL (ref 1–4.8)
LYMPHOCYTES NFR BLD: 26.5 % (ref 22–41)
MCH RBC QN AUTO: 29.8 PG (ref 27–33)
MCHC RBC AUTO-ENTMCNC: 31.7 G/DL (ref 32.2–35.5)
MCV RBC AUTO: 94.2 FL (ref 81.4–97.8)
MONOCYTES # BLD AUTO: 0.71 K/UL (ref 0–0.85)
MONOCYTES NFR BLD AUTO: 8 % (ref 0–13.4)
NEUTROPHILS # BLD AUTO: 5.52 K/UL (ref 1.82–7.42)
NEUTROPHILS NFR BLD: 62.1 % (ref 44–72)
NRBC # BLD AUTO: 0 K/UL
NRBC BLD-RTO: 0 /100 WBC (ref 0–0.2)
PLATELET # BLD AUTO: 314 K/UL (ref 164–446)
PMV BLD AUTO: 10.7 FL (ref 9–12.9)
POTASSIUM SERPL-SCNC: 4.5 MMOL/L (ref 3.6–5.5)
PROT SERPL-MCNC: 7.5 G/DL (ref 6–8.2)
RBC # BLD AUTO: 4.96 M/UL (ref 4.2–5.4)
SODIUM SERPL-SCNC: 137 MMOL/L (ref 135–145)
TRIGL SERPL-MCNC: 101 MG/DL (ref 0–149)
TSH SERPL-ACNC: 1.28 UIU/ML (ref 0.38–5.33)
WBC # BLD AUTO: 8.9 K/UL (ref 4.8–10.8)

## 2025-04-03 PROCEDURE — 36415 COLL VENOUS BLD VENIPUNCTURE: CPT

## 2025-04-03 PROCEDURE — 80061 LIPID PANEL: CPT

## 2025-04-03 PROCEDURE — 85025 COMPLETE CBC W/AUTO DIFF WBC: CPT

## 2025-04-03 PROCEDURE — 80053 COMPREHEN METABOLIC PANEL: CPT

## 2025-04-03 PROCEDURE — 84443 ASSAY THYROID STIM HORMONE: CPT

## 2025-04-03 PROCEDURE — 83036 HEMOGLOBIN GLYCOSYLATED A1C: CPT

## 2025-04-06 ENCOUNTER — RESULTS FOLLOW-UP (OUTPATIENT)
Dept: MEDICAL GROUP | Facility: LAB | Age: 70
End: 2025-04-06

## 2025-04-16 ENCOUNTER — OFFICE VISIT (OUTPATIENT)
Dept: MEDICAL GROUP | Facility: LAB | Age: 70
End: 2025-04-16
Payer: COMMERCIAL

## 2025-04-16 VITALS
SYSTOLIC BLOOD PRESSURE: 148 MMHG | WEIGHT: 214 LBS | DIASTOLIC BLOOD PRESSURE: 84 MMHG | BODY MASS INDEX: 39.38 KG/M2 | HEIGHT: 62 IN | HEART RATE: 106 BPM | OXYGEN SATURATION: 94 % | TEMPERATURE: 97.7 F | RESPIRATION RATE: 16 BRPM

## 2025-04-16 DIAGNOSIS — M54.42 CHRONIC LEFT-SIDED LOW BACK PAIN WITH LEFT-SIDED SCIATICA: ICD-10-CM

## 2025-04-16 DIAGNOSIS — Z12.11 SCREENING FOR COLON CANCER: ICD-10-CM

## 2025-04-16 DIAGNOSIS — G89.29 CHRONIC LEFT-SIDED LOW BACK PAIN WITH LEFT-SIDED SCIATICA: ICD-10-CM

## 2025-04-16 DIAGNOSIS — R51.9 NEW ONSET HEADACHE: ICD-10-CM

## 2025-04-16 DIAGNOSIS — Z00.00 WELL ADULT EXAM: ICD-10-CM

## 2025-04-16 DIAGNOSIS — Z12.31 SCREENING MAMMOGRAM FOR BREAST CANCER: ICD-10-CM

## 2025-04-16 PROCEDURE — 3077F SYST BP >= 140 MM HG: CPT | Performed by: NURSE PRACTITIONER

## 2025-04-16 PROCEDURE — 99397 PER PM REEVAL EST PAT 65+ YR: CPT | Performed by: NURSE PRACTITIONER

## 2025-04-16 PROCEDURE — 3079F DIAST BP 80-89 MM HG: CPT | Performed by: NURSE PRACTITIONER

## 2025-04-16 ASSESSMENT — FIBROSIS 4 INDEX: FIB4 SCORE: 1

## 2025-04-16 NOTE — PROGRESS NOTES
Verbal consent was acquired by the patient to use [employer] ambient listening note generation during this visit Yes      Subjective   Nette Thornton is a 70 y.o. female who presents for annual exam.   History of Present Illness  The patient is a 70-year-old established female here for her annual exam.    She has been experiencing new onset severe headaches, described as excruciating, localized at the base of her skull and extending to the soft spot on top of her head. These headaches have been persistent for the past 2 weeks, with the most recent episode occurring yesterday. Headache not present today.  Despite taking Tylenol, 3 tablets at a time, the pain remains unrelieved. There is no history of migraines.   No sensitivity to light or sound is experienced during these episodes. The headaches are not constant but occur intermittently, with periods of relief in between. No recent head trauma or falls are reported. Neurological function remains intact, with no issues in vision, typing, walking, or smelling. These headaches are described as the worst she has ever experienced. No neck pain is reported.     Worsening on chronic low back pain.  Saw her orthopedist for knee arthritis and she was complaining of back and hip pain.  Hip only showed mild arthritis in the left side.  It was suggested to her that she investigate her back further.  She denies that she has arthritis in the lower back but has not had imaging in quite some time.  Pain in the left sciatic nerve makes walking difficult. She is unable to work outside due to her pain. Gabapentin 300 mg is taken at night for pain, which she finds helpful. She averages about 6 hours of sleep per night.  She has pain with walking, sitting, standing but not lying down.    Recent weight gain is reported, and she is seeking a medication to aid in weight loss. She lost 10 pounds while on Wegovy for one month, but her insurance does not cover Wegovy.  She is avoiding white  "bread, potatoes, rice, and pasta. Consumption includes 100% whole wheat bread, salads, shrimp  -although breaded, chicken, and fish. Sugary drinks are avoided, and peanut butter is snacked on throughout the day.    A mammogram has not been done this year, and she is unsure if she needs one.    Losartan is taken for blood pressure management. She used to check her blood pressure at home but has not done so recently. During a visit to the dentist a couple of weeks ago, her blood pressure was 117/76, which she feels is too low for her, per patient. She feels good when her blood pressure is around 130. She gets tired during the day and feels her blood pressure is peaking and then dropping. She switched to taking her medication in the mornings with all her other supplements and vitamins and does not feel bad. She does not get that drained, tired feeling and has more energy during the day. She thinks it actually helps her sleep a little bit better at night.    She has a history of right knee replacement.    PAST SURGICAL HISTORY:  Right knee replacement.    SOCIAL HISTORY  She drinks vodka and diet tonic water with lemon juice at night.    Review of Systems  Negative except for HPI  Objective   BP (!) 148/84 (BP Location: Left arm, Patient Position: Sitting, BP Cuff Size: Large adult)   Pulse (!) 106   Temp 36.5 °C (97.7 °F)   Resp 16   Ht 1.575 m (5' 2\")   Wt 97.1 kg (214 lb)   SpO2 94%   Physical Exam    General: No apparent distress, well-developed, well-nourished, overweight.    Neurological: Awake, alert, oriented x4, no focal deficit  Head: Normocephalic, atraumatic.  No rashes to her occipital skull.  Neck: Supple, no abnormalities.  Full range of motion without induced pain.  Respiratory: Clear to auscultation, no wheezing, rales or rhonchi  Cardiovascular: Regular rate and rhythm, no murmurs, rubs, or gallops  Musculoskeletal: She does have full range of motion of all of her extremities.    Results  Labs   " "- Glucose: 04/2025, 105 mg/dL   - Hemoglobin A1c: 04/2025, 5.5%   - LDL: 04/2025, 72 mg/dL   - HDL: 04/2025, 68 mg/dL   - Total Cholesterol: 04/2025, 160 mg/dL       Assessment & Plan  \"  1. Well adult exam        2. Screening for colon cancer  Cologuard® colon cancer screening      3. Chronic left-sided low back pain with left-sided sciatica  MR-LUMBAR SPINE-W/O    Referral to Pain Clinic      4. New onset headache  CT-HEAD W/O      5. Screening mammogram for breast cancer  MA-SCREENING MAMMO BILAT W/TOMOSYNTHESIS W/CAD      \"  Biggest concern today is new onset headaches in a patient that does not regularly get headaches.  Recommend CT scan of her head and if negative but headaches persist, MRI of her brain with and without.  Will follow-up with her as soon as a CT returns.  I encouraged her to go to the emergency department if her headaches worsen in the interim.  Also recommend MRI of her lumbar spine considering that she has left-sided sciatica and has had chronic pain for years that is worsening, prohibiting her from walking for greater than 15 to 20 minutes.  Reviewed recent notes from her orthopedist regarding only mild arthritis in her left hip.  We had a good discussion about her diet, weight loss, portion control, high fiber, vegetable based, lean protein diet, high water intake and trying to incorporate more exercise when possible.  Recommend updated mammogram.  Declines a formal colonoscopy, ordered a Cologuard.  Labs all up-to-date and reviewed.  Anticipatory guidance:  Encouraged daily physical exercise, high fiber / vegetable based diet, 8 hours of sleep at night, skin protection from sun with suncreen / clothing, yearly derm consults.    She does try to walk as much as she can.  She verbalized severe pain flareups prohibiting her from comfortably walking from the car to indoors such as when she has to go shopping or go to the grocery store.  Completed DMV paperwork for her to get a handicap license " plate or placard.                 Please note that this dictation was created using voice recognition software. I have made every reasonable attempt to correct obvious errors, but I expect that there are errors of grammar and possibly content that I did not discover before finalizing the note.

## 2025-04-21 ENCOUNTER — TELEPHONE (OUTPATIENT)
Dept: HEALTH INFORMATION MANAGEMENT | Facility: OTHER | Age: 70
End: 2025-04-21

## 2025-05-13 LAB — NONINV COLON CA DNA+OCC BLD SCRN STL QL: NEGATIVE

## 2025-05-14 ENCOUNTER — RESULTS FOLLOW-UP (OUTPATIENT)
Dept: MEDICAL GROUP | Facility: LAB | Age: 70
End: 2025-05-14

## 2025-05-14 DIAGNOSIS — G89.29 CHRONIC NECK PAIN: Primary | ICD-10-CM

## 2025-05-14 DIAGNOSIS — M54.2 CHRONIC NECK PAIN: Primary | ICD-10-CM

## 2025-05-19 RX ORDER — MELOXICAM 15 MG/1
15 TABLET ORAL DAILY
Qty: 30 TABLET | Refills: 3 | Status: SHIPPED | OUTPATIENT
Start: 2025-05-19

## 2025-05-28 ENCOUNTER — HOSPITAL ENCOUNTER (OUTPATIENT)
Dept: RADIOLOGY | Facility: MEDICAL CENTER | Age: 70
End: 2025-05-28
Attending: NURSE PRACTITIONER
Payer: COMMERCIAL

## 2025-05-28 DIAGNOSIS — R51.9 NEW ONSET HEADACHE: ICD-10-CM

## 2025-05-28 DIAGNOSIS — Z12.31 SCREENING MAMMOGRAM FOR BREAST CANCER: ICD-10-CM

## 2025-05-28 DIAGNOSIS — G89.29 CHRONIC LEFT-SIDED LOW BACK PAIN WITH LEFT-SIDED SCIATICA: ICD-10-CM

## 2025-05-28 DIAGNOSIS — M54.42 CHRONIC LEFT-SIDED LOW BACK PAIN WITH LEFT-SIDED SCIATICA: ICD-10-CM

## 2025-05-28 PROCEDURE — 72148 MRI LUMBAR SPINE W/O DYE: CPT

## 2025-05-28 PROCEDURE — 77067 SCR MAMMO BI INCL CAD: CPT

## 2025-05-28 PROCEDURE — 70450 CT HEAD/BRAIN W/O DYE: CPT

## 2025-06-04 ENCOUNTER — APPOINTMENT (OUTPATIENT)
Dept: MEDICAL GROUP | Facility: LAB | Age: 70
End: 2025-06-04
Payer: COMMERCIAL

## 2025-06-04 VITALS
BODY MASS INDEX: 39.75 KG/M2 | HEIGHT: 62 IN | TEMPERATURE: 96.5 F | OXYGEN SATURATION: 95 % | SYSTOLIC BLOOD PRESSURE: 158 MMHG | RESPIRATION RATE: 16 BRPM | DIASTOLIC BLOOD PRESSURE: 84 MMHG | HEART RATE: 82 BPM | WEIGHT: 216 LBS

## 2025-06-04 DIAGNOSIS — G89.29 CHRONIC NECK PAIN: ICD-10-CM

## 2025-06-04 DIAGNOSIS — M54.42 CHRONIC BILATERAL LOW BACK PAIN WITH LEFT-SIDED SCIATICA: ICD-10-CM

## 2025-06-04 DIAGNOSIS — M54.2 CHRONIC NECK PAIN: ICD-10-CM

## 2025-06-04 DIAGNOSIS — G89.29 CHRONIC BILATERAL LOW BACK PAIN WITH LEFT-SIDED SCIATICA: ICD-10-CM

## 2025-06-04 DIAGNOSIS — M71.30 SYNOVIAL CYST: ICD-10-CM

## 2025-06-04 DIAGNOSIS — I10 ESSENTIAL HYPERTENSION: ICD-10-CM

## 2025-06-04 PROCEDURE — 3079F DIAST BP 80-89 MM HG: CPT | Performed by: NURSE PRACTITIONER

## 2025-06-04 PROCEDURE — 3077F SYST BP >= 140 MM HG: CPT | Performed by: NURSE PRACTITIONER

## 2025-06-04 PROCEDURE — 99214 OFFICE O/P EST MOD 30 MIN: CPT | Performed by: NURSE PRACTITIONER

## 2025-06-04 RX ORDER — GABAPENTIN 300 MG/1
300-900 CAPSULE ORAL 3 TIMES DAILY
Qty: 270 CAPSULE | Refills: 3 | Status: SHIPPED | OUTPATIENT
Start: 2025-06-04

## 2025-06-04 RX ORDER — LOSARTAN POTASSIUM 50 MG/1
50 TABLET ORAL DAILY
Qty: 100 TABLET | Refills: 3 | Status: SHIPPED | OUTPATIENT
Start: 2025-06-04

## 2025-06-04 ASSESSMENT — FIBROSIS 4 INDEX: FIB4 SCORE: 1

## 2025-06-04 NOTE — PROGRESS NOTES
Verbal consent was acquired by the patient to use [employer] ambient listening note generation during this visit Yes      Subjective   Nette Thornton is a 70 y.o. female who presents for f/u  History of Present Illness  The patient is a 70-year-old established female here to follow up on multiple imaging studies.    Hypertension: Currently taking 25 mg of losartan.  Not regularly checking her blood pressure at home.  Blood pressure elevated today.  Denies chest pain.  Having headaches.    Chronic neck and low back pain: She describes persistent/worsening neck pain, which was particularly severe on Saturday, necessitating the use of 3 Tylenol tablets without significant relief. The pain extends to her shoulders and arms, accompanied by an unusual sensation. She suspects a partially pinched nerve in her neck. She also reports frequent cracking sounds in her neck. She has attempted physical therapy twice for her lower back without success and has not tried back braces. She has been taking meloxicam daily and gabapentin at night.  She is not sleeping well.  She experiences back pain that occasionally radiates down her right leg, which she believes is due to a pinched nerve. She also reports intermittent sciatic pain.  She experiences severe headaches.  She is unable to stand for longer than a few minutes to cook or shop periodically because of intense low back pain.    Denies any right upper quadrant pain or chronic nausea/vomiting.    She reports poor sleep quality, often waking up after a few hours of sleep. She has not undergone a sleep study but reports snoring. She attributes her sleep issues to narrow nasal passages due to cartilage growth.        FAMILY HISTORY  Her mother had her gallbladder removed during her second colon surgery.    Review of Systems  Negative except for HPI  Objective   BP (!) 158/84 (BP Location: Right arm, Patient Position: Sitting, BP Cuff Size: Large adult)   Pulse 82   Temp 35.8 °C  "(96.5 °F)   Resp 16   Ht 1.575 m (5' 2\")   Wt 98 kg (216 lb)   SpO2 95%   Physical Exam  Gen. appears healthy in no distress   Skin appropriate for sex and age   Neck trachea is midline  Lungs unlabored breathing  Heart regular rate  Neuro gait is slight limp.  Seated comfortably on exam table.  Full sensation to bilateral lower extremities with soft touch.  Psych appropriate, calm, interactive, well-groomed    Results  Imaging  Reviewed CT of her brain and MRI of her lumbar spine with her.       Assessment & Plan  1. Hypertension  Blood pressure readings have been consistently elevated, with a recent measurement of 160/90.  Treatment plan: Increased dosage of losartan to 50 mg. Prescription for 90 tablets issued. Monitoring of blood pressure and adjustment of medication as needed.  Encouraged her to notify me if home readings are consistently greater than 130/85.  Encouraged her to stop smoking.  Encouraged weight loss.  Encouraged a low-salt diet.  Follow-up 1 to 2 months.    2.  Chronic neck pain:  Also having headaches.  Negative CT of her brain.  Recommend x-ray of her neck and following up with physiatry.  Referred for physical therapy.  Discussed neck stretches.  Taking meloxicam.  Increase gabapentin capabilities, encouraged her to increase this slowly by 300 mg every 3 days as tolerated.    3. Headaches  Likely related to neck pain.  Treatment plan: Advised to take 3 gabapentin tablets approximately an hour before bedtime to improve sleep quality. Monitoring for improvement in headache frequency and severity.    4.  Gallstones: Currently asymptomatic.    5.  Lumbar degenerative disc disease with synovial cyst present: Referred to physiatry and physical therapy.  Increased gabapentin as above.  On meloxicam.  Discussed stretches.  Encouraged her to avoid heavy lifting.  Follow-up 1 to 2 months.                   Please note that this dictation was created using voice recognition software. I have made " every reasonable attempt to correct obvious errors, but I expect that there are errors of grammar and possibly content that I did not discover before finalizing the note.

## 2025-06-10 ENCOUNTER — TELEPHONE (OUTPATIENT)
Dept: MEDICAL GROUP | Facility: LAB | Age: 70
End: 2025-06-10

## 2025-06-10 ENCOUNTER — OFFICE VISIT (OUTPATIENT)
Dept: PHYSICAL MEDICINE AND REHAB | Facility: MEDICAL CENTER | Age: 70
End: 2025-06-10
Payer: COMMERCIAL

## 2025-06-10 VITALS
OXYGEN SATURATION: 93 % | HEIGHT: 63 IN | BODY MASS INDEX: 38.27 KG/M2 | SYSTOLIC BLOOD PRESSURE: 152 MMHG | HEART RATE: 90 BPM | TEMPERATURE: 98 F | DIASTOLIC BLOOD PRESSURE: 88 MMHG | WEIGHT: 216 LBS

## 2025-06-10 DIAGNOSIS — M47.816 SPONDYLOSIS OF LUMBAR SPINE: ICD-10-CM

## 2025-06-10 DIAGNOSIS — G89.29 CHRONIC BILATERAL LOW BACK PAIN WITHOUT SCIATICA: Primary | ICD-10-CM

## 2025-06-10 DIAGNOSIS — M54.50 CHRONIC BILATERAL LOW BACK PAIN WITHOUT SCIATICA: Primary | ICD-10-CM

## 2025-06-10 DIAGNOSIS — M48.061 FORAMINAL STENOSIS OF LUMBAR REGION: ICD-10-CM

## 2025-06-10 PROCEDURE — 3079F DIAST BP 80-89 MM HG: CPT | Performed by: STUDENT IN AN ORGANIZED HEALTH CARE EDUCATION/TRAINING PROGRAM

## 2025-06-10 PROCEDURE — 1125F AMNT PAIN NOTED PAIN PRSNT: CPT | Performed by: STUDENT IN AN ORGANIZED HEALTH CARE EDUCATION/TRAINING PROGRAM

## 2025-06-10 PROCEDURE — 99204 OFFICE O/P NEW MOD 45 MIN: CPT | Performed by: STUDENT IN AN ORGANIZED HEALTH CARE EDUCATION/TRAINING PROGRAM

## 2025-06-10 PROCEDURE — 3077F SYST BP >= 140 MM HG: CPT | Performed by: STUDENT IN AN ORGANIZED HEALTH CARE EDUCATION/TRAINING PROGRAM

## 2025-06-10 ASSESSMENT — PATIENT HEALTH QUESTIONNAIRE - PHQ9
CLINICAL INTERPRETATION OF PHQ2 SCORE: 4
5. POOR APPETITE OR OVEREATING: 2 - MORE THAN HALF THE DAYS
SUM OF ALL RESPONSES TO PHQ QUESTIONS 1-9: 10

## 2025-06-10 ASSESSMENT — FIBROSIS 4 INDEX: FIB4 SCORE: 1

## 2025-06-10 ASSESSMENT — PAIN SCALES - GENERAL: PAINLEVEL_OUTOF10: 1=MINIMAL PAIN

## 2025-06-10 NOTE — PROGRESS NOTES
[facility] (Physical Medicine and Rehabilitation)  Sports Medicine& Interventional Spine   New Patient Visit    Patient Name: Stefany Thornton   Patient : 1955  PCP: YOGI Butts  MRN: [id number]     Date of service: 06/10/25    Referring provider: Mitzy Deal A*    CHIEF COMPLAINT  Chief Complaint   Patient presents with    New Patient     Back pain         Stefany Thornton is a  70 y.o. very pleasant female  who presents today with The primary encounter diagnosis was Chronic bilateral low back pain without sciatica. Diagnoses of Spondylosis of lumbar spine and Foraminal stenosis of lumbar region were also pertinent to this visit.    Verbal consent was obtained for [employer] copilot: Yes      Medical records review:  I reviewed the note from the referring provider Mitzy Deal A* including the note dated 25.    Prior Relevant MSK/Interventional Procedures:   Patient has not attempted prior ortho/joint injections.   Patient has not had prior ortho/joint surgery.           HPI:    History of Present Illness  The patient is a 70-year-old female who presents today for initial evaluation of chronic low back pain. Pain today is reported to be 1 out of 10 in severity. She notes sharp pain at the midline of the low back. She also notes aching pain at the neck. Pain has been present for the past 8 months. She has done physical therapy. Previous medications include meloxicam and gabapentin. Pain is worse with standing, walking, walking upstairs and downstairs. Pain is improved with bending forward and bending backward. X-rays of the cervical spine and MRI of the lumbar spine were ordered.    She reports experiencing severe lower back pain, which she attributes to arthritis, bulging disks, and foraminal stenosis as revealed by an MRI scan. The pain is so intense that it limits her outdoor activities to a maximum of 10 minutes before she needs to rest indoors. She also experiences  discomfort in her left sciatic nerve, although this is not a significant issue. Her current pain management regimen includes Tylenol, meloxicam, and nightly doses of gabapentin to aid sleep. She occasionally takes half a Percocet tablet for severe pain or headaches, a practice she started following her 's death in 08/2024. The onset of her back pain coincided with a period of prolonged sitting while caring for her hospitalized  from mid-05/2024 to mid-08/2024. She has been informed that her pain could be alleviated by draining the cyst. She has previously undergone physical therapy for her back pain, which also radiates to her hips, but found it unhelpful. She has not yet scheduled her physical therapy sessions.    She also reports experiencing headaches, which she believes may be due to arthritis or a pinched nerve. She describes a sensation of dead weight in her arms, which she suspects may be related to her neck issues. She has been advised to undergo x-rays of her neck.    MEDICATIONS  Current: Tylenol, meloxicam, gabapentin      ROS:   Fever, Chills, Sweats: Denies  Involuntary Weight Loss: Denies  Bowel/bladder issues: denies   See HPI.   All other systems reviewed and negative.         GOALS OF TREATMENT: Symptom Pain relief. improve function.      Psychological testing for pain as depression and pain commonly coexist and need to both be treated.     Opioid Risk Score: 0      Interpretation of Opioid Risk Score   Score 0-3 = Low risk of abuse. Do UDS at least once per year.  Score 4-7 = Moderate risk of abuse. Do UDS 1-4 times per year.  Score 8+ = High risk of abuse. Refer to specialist.    PHQ      5/25/2023     1:20 PM 1/18/2024     9:00 AM 6/10/2025     9:20 AM   Depression Screen (PHQ-2/PHQ-9)   PHQ-2 Total Score 0 0 4   PHQ-9 Total Score   10       Interpretation of PHQ-9 Total Score   Score Severity   1-4 No Depression   5-9 Mild Depression   10-14 Moderate Depression   15-19 Moderately  "Severe Depression   20-27 Severe Depression      PMHx:   Past Medical History[1]    PSHx:   Past Surgical History[2]    Family history   Family History   Problem Relation Age of Onset    Heart Disease Mother     Cancer Mother         colon    Cancer Father         prostate    Cancer Brother         lung - smoker       Medications: reviewed on epic.   Active Medications[3]     Allergies:   Allergies[4]    Social Hx:   Social History     Socioeconomic History    Marital status:      Spouse name: Not on file    Number of children: Not on file    Years of education: Not on file    Highest education level: Not on file   Occupational History    Not on file   Tobacco Use    Smoking status: Every Day     Current packs/day: 0.50     Average packs/day: 0.5 packs/day for 51.8 years (25.9 ttl pk-yrs)     Types: Cigarettes     Start date: 9/2/1973    Smokeless tobacco: Never    Tobacco comments:     less than 1 ppd.  quit for 10 yrs in her 30's   Vaping Use    Vaping status: Never Used   Substance and Sexual Activity    Alcohol use: Yes     Alcohol/week: 8.4 oz     Types: 14 Shots of liquor per week     Comment: 1-2 per day    Drug use: Never    Sexual activity: Not on file     Comment: ; three kids; wk:  [employer]; born: [place]   Other Topics Concern    Not on file   Social History Narrative    Not on file     Social Drivers of Health     Financial Resource Strain: Not on file   Food Insecurity: Not on file   Transportation Needs: Not on file   Physical Activity: Not on file   Stress: Not on file   Social Connections: Not on file   Intimate Partner Violence: Not on file   Housing Stability: Not on file         EXAMINATION   Vitals: BP (!) 152/88 (BP Location: Left arm, Patient Position: Sitting, BP Cuff Size: Large adult)   Pulse 90   Temp 36.7 °C (98 °F) (Temporal)   Ht 1.588 m (5' 2.5\")   Wt 98 kg (216 lb)   SpO2 93%   Physical Exam:     Body Habitus: Body mass index is 38.88 kg/m².  Appearance: " Well-groomed, well-nourished, not disheveled  Eyes: No scleral icterus to suggest severe liver disease, no proptosis to suggest severe hyperthyroid  ENT -no obvious auditory deficits, no external lesions, moist mucus membranes   Skin -no rashes or lesions noted. No appreciable skin breakdown on exposed skin areas.    Respiratory-  breathing comfortably on room air, no audible wheezing, full sentences  Cardiovascular- No lower extremity edema noted.   Psychiatric- alert and oriented, calm, comfortable, cooperative     Musculoskeletal and Neuro:  Gait and station - normal gait with reciprocal pattern,  no presence/use of ambulatory device, no arm assistance with sit-to-stand, nonantalgic. no loss of balance during exam.  No change in patient's demeanor with exam.  Grossly normal cranial nerve exam  Coordination grossly intact      Physical Exam  Musculoskeletal system shows 5 out of 5 strength throughout bilateral lower extremities. Sensation is intact to light touch throughout bilateral lower extremities.        Thoracic/Lumbar Spine/Sacral Spine/Hips   Inspection: No evidence of atrophy in bilateral lower extremities throughout     ROM: full  active range of motion with flexion, lateral flexion, and rotation bilaterally.   There is full  active range of motion with lumbar extension without pain.    There is no pain with facet loading maneuver (extension rotation) with axial low back pain on the BILATERAL side(s)    Palpation:   No tenderness to palpation in midline at T1-T12 levels. No tenderness to palpation in the left and right of the midline T1-L5  palpation over SI joint: negative bilaterally  palpation in hip or over the gluteus medius tendon insertion: negative bilaterally     Lumbar spine Special tests  Neuro tension  Straight leg test negative bilaterally  Slump test negative bilaterally    HIP  FAIR test negative bilaterally   Range of motion in the RIGHT hip is full  in flexion, extension, abduction,  "internal rotation, external rotation.  Range of motion in the LEFT hip is full  in flexion, extension, abduction, internal rotation, external rotation.  Nik negative bilaterally      SI joint tests  Observation patient sits on one buttocks: Negative  SI joint compression negative bilaterally    SI joint distraction negative bilaterally  Thigh thrust test negative bilaterally   NIK test negative bilaterally  Gaenslen test negative bilaterally    Key points for the international standards for neurological classification of spinal cord injury (ISNCSCI) to light touch.   Dermatome R L   L2 2 2   L3 2 2   L4 2 2   L5 2 2   S1 2 2     Motor Exam Lower Extremities  ? Myotome R L   Hip flexion L2 5 5   Knee extension L3 5 5   Ankle dorsiflexion L4 5 5   Toe extension L5 5 5   Ankle plantarflexion S1 5 5   Hip Abduction  5 5   Hip Adduction  5 5     Reflexes  Clonus of the ankle negative bilaterally   ? R L   Patella 2+ 2+   Achilles  2+ 2+   Babinski sign negative bilaterally         MEDICAL DECISION MAKING    Medical records review: see under HPI section.     DATA    Labs:   Lab Results   Component Value Date/Time    SODIUM 137 04/03/2025 10:41 AM    POTASSIUM 4.5 04/03/2025 10:41 AM    CHLORIDE 102 04/03/2025 10:41 AM    CO2 23 04/03/2025 10:41 AM    ANION 12.0 04/03/2025 10:41 AM    GLUCOSE 105 (H) 04/03/2025 10:41 AM    BUN 18 04/03/2025 10:41 AM    CREATININE 0.92 04/03/2025 10:41 AM    CALCIUM 9.7 04/03/2025 10:41 AM    ASTSGOT 22 04/03/2025 10:41 AM    ALTSGPT 24 04/03/2025 10:41 AM    TBILIRUBIN 0.5 04/03/2025 10:41 AM    ALBUMIN 4.5 04/03/2025 10:41 AM    TOTPROTEIN 7.5 04/03/2025 10:41 AM    GLOBULIN 3.0 04/03/2025 10:41 AM    AGRATIO 1.5 04/03/2025 10:41 AM   ]    No results found for: \"PROTHROMBTM\", \"INR\"     Lab Results   Component Value Date/Time    WBC 8.9 04/03/2025 10:41 AM    RBC 4.96 04/03/2025 10:41 AM    HEMOGLOBIN 14.8 04/03/2025 10:41 AM    HEMATOCRIT 46.7 04/03/2025 10:41 AM    MCV 94.2 " 04/03/2025 10:41 AM    MCH 29.8 04/03/2025 10:41 AM    MCHC 31.7 (L) 04/03/2025 10:41 AM    MPV 10.7 04/03/2025 10:41 AM    NEUTSPOLYS 62.10 04/03/2025 10:41 AM    LYMPHOCYTES 26.50 04/03/2025 10:41 AM    MONOCYTES 8.00 04/03/2025 10:41 AM    EOSINOPHILS 2.00 04/03/2025 10:41 AM    BASOPHILS 0.90 04/03/2025 10:41 AM        Lab Results   Component Value Date/Time    HBA1C 5.5 04/03/2025 10:41 AM        Imaging:   I personally reviewed following images, these are my reads  Results  Imaging  MRI of the lumbar spine dated 05/29/2025 shows diffuse facet arthropathy at L4-L5, disk bulge with grade 1 anterolisthesis of L4 on L5 with moderate canal narrowing and moderate to severe bilateral foraminal narrowing. At L5-S1 there is broad based disk bulge with severe facet degenerative changes and moderate to severe bilateral foraminal narrowing.        IMAGING radiology reads. I reviewed the following radiology reads                      Results for orders placed during the hospital encounter of 05/28/25    MR-LUMBAR SPINE-W/O    Impression  Grade 1 anterolisthesis of L4 over L5.    Moderate canal narrowing at L4-5. Moderate to severe bilateral foraminal narrowing at L4-5.    Severe bilateral foraminal narrowing at L5-S1. There is probably an intermediate signal left facet origin synovial cyst projecting into the posterior left foramen and impinging upon the left L5 nerve.      Cholelithiasis without cholecystitis. The common bile duct is slightly enlarged. Choledocholithiasis cannot be completely excluded.        Results for orders placed during the hospital encounter of 05/28/25    MR-LUMBAR SPINE-W/O    Impression  Grade 1 anterolisthesis of L4 over L5.    Moderate canal narrowing at L4-5. Moderate to severe bilateral foraminal narrowing at L4-5.    Severe bilateral foraminal narrowing at L5-S1. There is probably an intermediate signal left facet origin synovial cyst projecting into the posterior left foramen and impinging  upon the left L5 nerve.      Cholelithiasis without cholecystitis. The common bile duct is slightly enlarged. Choledocholithiasis cannot be completely excluded.                                                          Results for orders placed during the hospital encounter of 10/25/22    DX-KNEE 2- RIGHT    Impression  1. Status post right knee replacement without evidence of hardware complication.     Results for orders placed in visit on 07/15/19    DX-KNEE COMPLETE 4+ RIGHT    Impression  No evidence of acute fracture or dislocation.    Small joint effusion.   Results for orders placed during the hospital encounter of 22    DX-LUMBAR SPINE-2 OR 3 VIEWS    Impression  1.  Grade 1 anterolisthesis of L4 on L5.  2.  Minimal retrolisthesis of L1 on L2.  3.  Multilevel degenerative changes, most prominent in the lower thoracic spine and at L5/S1.  4.  Facet arthropathy throughout the lumbar spine.  5.  Atherosclerotic plaque.                           ASSESSMENT AND PLAN:  Stefany Thornton   : 1955     Diagnoses and all orders for this visit:  1. Chronic bilateral low back pain without sciatica        2. Spondylosis of lumbar spine        3. Foraminal stenosis of lumbar region              Assessment & Plan  1. Chronic low back pain.  The chronic low back pain is likely due to degenerative changes in the lumbar spine, specifically at L4-L5 and L5-S1, with severe facet arthritis and disk bulging. There is no evidence of spinal cord compression. The pain is likely originating from the joints of L4-L5 and L5-S1. Physical therapy is recommended to strengthen the back muscles. If there is no improvement after 6 to 8 weeks of physical therapy, medial branch blocks may be considered. If the blocks are successful, a medial branch neurotomy could provide long-term relief. Continue current medications including meloxicam, gabapentin, and Tylenol as directed.    2. Cervical spine pain.  The patient reports aching  pain in the neck radiating to the shoulders and arms, with a sensation of dead weight in the arms. X-rays of the cervical spine have been ordered to further evaluate the condition.    Follow-up  The patient will follow up in 6 to 8 weeks.      No orders of the defined types were placed in this encounter.      -Medications/Modalities: Previously prescribed medications  -Limitations: Activity as tolerated     -Therapy (PT/OT/Aquatherapy): has been ordered  -Home exercise program: encouraged and provided home exercises of regular strengthening and stretching.   -Diagnostic workup: reviewed today as above;   -Interventional program: will consider at a later time  -Referrals: none required at this time    Follow-up: Return to clinic in 6-8 weeks for follow-up of symptomatology, or sooner as needed for any acute issues.    Patient expressed understanding of the management plan. Patient (and Family Members) was/were encouraged to call if any worries, issues, problems or concerns prior to the next visit     Please note that this dictation was created using voice recognition software. I have made every reasonable attempt to correct obvious errors but there may be errors of grammar and content that I may have overlooked prior to finalization of this note.    Anshu Mar DO  Physical Medicine and Rehabilitation  Sports Medicine and Spine  [facility]           GRAHAM Bloom.   CC Mitzy Deal A*         [1]   Past Medical History:  Diagnosis Date    Arthritis     knees    Pain     Right knee   [2]   Past Surgical History:  Procedure Laterality Date    KNEE ARTHROPLASTY TOTAL Right 10/25/2022    Procedure: RIGHT TOTAL KNEE REPLACEMENT;  Surgeon: Roge Gomez M.D.;  Location: SURGERY [facility];  Service: Orthopedics    HAND SURGERY  1985    right wrist ligament contraction release in her 30's   [3]   Outpatient Medications Marked as Taking for the 6/10/25 encounter (Office Visit)  with Anshu Mar D.O.   Medication Sig Dispense Refill    gabapentin (NEURONTIN) 300 MG Cap Take 1-3 Capsules by mouth 3 times a day. 270 Capsule 3    losartan (COZAAR) 50 MG Tab Take 1 Tablet by mouth every day. At night 100 Tablet 3    meloxicam (MOBIC) 15 MG tablet TAKE 1 TABLET BY MOUTH EVERY DAY 30 Tablet 3    rosuvastatin (CRESTOR) 10 MG Tab Take 1 Tablet by mouth every evening. For cholesterol. 90 Tablet 3    diclofenac sodium (VOLTAREN) 1 % Gel Apply 2 g topically 4 times a day as needed (to arthritis joints). 300 g 5    aspirin EC (ECOTRIN) 325 MG Tablet Delayed Response Take 325 mg by mouth every day.      Potassium 99 MG Tab Take  by mouth every day.      VITAMIN D PO Take 1,000 Units by mouth every day.      diphenhydrAMINE (BENADRYL) 25 MG Tab Take 25 mg by mouth every 6 hours as needed for Sleep.      Multiple Vitamin (MULTI-VITAMIN PO) Take  by mouth every day.      Ascorbic Acid (VITAMIN C) 1000 MG Tab Take  by mouth every day.      Magnesium 300 MG Cap Take  by mouth every day.      B Complex Vitamins (VITAMIN B COMPLEX PO) Take  by mouth every day.     [4] No Known Allergies

## 2025-06-11 NOTE — Clinical Note
REFERRAL APPROVAL NOTICE         Sent on June 11, 2025                   Nette Thornton  [address]  [facility] [place] [facility] [address]                   Dear Ms. Thornton,    After a careful review of the medical information and benefit coverage, [facility] has processed your referral. See below for additional details.    If applicable, you must be actively enrolled with your insurance for coverage of the authorized service. If you have any questions regarding your coverage, please contact your insurance directly.    REFERRAL INFORMATION   Referral #:  [id number]  Referred-To Provider    Referred-By Provider:  Physical Therapy    YOGI Butts   [facility]      [address]  [place] [address]  [phone] [id number]  [address]  [place] [address]  [phone]    Referral Start Date:  06/04/2025  Referral End Date:   06/04/2026             SCHEDULING  If you do not already have an appointment, please call [phone] to make an appointment.     MORE INFORMATION  If you do not already have a ServiceRelated account, sign up at: [email or web address]  You can access your medical information, make appointments, see lab results, billing information, and more.  If you have questions regarding this referral, please contact  the [facility] Referrals department at:             [phone]. Monday - Friday 8:00AM - 5:00PM.     Sincerely,    [facility]

## 2025-06-21 ENCOUNTER — HOSPITAL ENCOUNTER (OUTPATIENT)
Dept: RADIOLOGY | Facility: MEDICAL CENTER | Age: 70
End: 2025-06-21
Attending: NURSE PRACTITIONER
Payer: COMMERCIAL

## 2025-06-21 DIAGNOSIS — G89.29 CHRONIC NECK PAIN: ICD-10-CM

## 2025-06-21 DIAGNOSIS — M54.2 CHRONIC NECK PAIN: ICD-10-CM

## 2025-06-21 PROCEDURE — 72040 X-RAY EXAM NECK SPINE 2-3 VW: CPT

## 2025-07-03 ENCOUNTER — HOSPITAL ENCOUNTER (OUTPATIENT)
Dept: RADIOLOGY | Facility: MEDICAL CENTER | Age: 70
End: 2025-07-03
Attending: NURSE PRACTITIONER
Payer: COMMERCIAL

## 2025-07-03 DIAGNOSIS — G89.29 CHRONIC NECK PAIN: ICD-10-CM

## 2025-07-03 DIAGNOSIS — M54.2 CHRONIC NECK PAIN: ICD-10-CM

## 2025-07-03 PROCEDURE — 72141 MRI NECK SPINE W/O DYE: CPT

## 2025-07-05 ENCOUNTER — APPOINTMENT (OUTPATIENT)
Dept: RADIOLOGY | Facility: MEDICAL CENTER | Age: 70
End: 2025-07-05
Attending: NURSE PRACTITIONER
Payer: COMMERCIAL

## 2025-07-16 ENCOUNTER — OFFICE VISIT (OUTPATIENT)
Dept: PHYSICAL MEDICINE AND REHAB | Facility: MEDICAL CENTER | Age: 70
End: 2025-07-16
Payer: COMMERCIAL

## 2025-07-16 VITALS
HEIGHT: 63 IN | DIASTOLIC BLOOD PRESSURE: 60 MMHG | BODY MASS INDEX: 38.27 KG/M2 | WEIGHT: 216 LBS | TEMPERATURE: 96.8 F | SYSTOLIC BLOOD PRESSURE: 159 MMHG | OXYGEN SATURATION: 91 % | HEART RATE: 91 BPM

## 2025-07-16 DIAGNOSIS — G89.29 CHRONIC NECK PAIN: ICD-10-CM

## 2025-07-16 DIAGNOSIS — M54.2 CHRONIC NECK PAIN: ICD-10-CM

## 2025-07-16 DIAGNOSIS — M54.12 CERVICAL RADICULOPATHY: Primary | ICD-10-CM

## 2025-07-16 PROCEDURE — 3078F DIAST BP <80 MM HG: CPT | Performed by: STUDENT IN AN ORGANIZED HEALTH CARE EDUCATION/TRAINING PROGRAM

## 2025-07-16 PROCEDURE — 99214 OFFICE O/P EST MOD 30 MIN: CPT | Performed by: STUDENT IN AN ORGANIZED HEALTH CARE EDUCATION/TRAINING PROGRAM

## 2025-07-16 PROCEDURE — 3077F SYST BP >= 140 MM HG: CPT | Performed by: STUDENT IN AN ORGANIZED HEALTH CARE EDUCATION/TRAINING PROGRAM

## 2025-07-16 PROCEDURE — 1125F AMNT PAIN NOTED PAIN PRSNT: CPT | Performed by: STUDENT IN AN ORGANIZED HEALTH CARE EDUCATION/TRAINING PROGRAM

## 2025-07-16 ASSESSMENT — FIBROSIS 4 INDEX: FIB4 SCORE: 1

## 2025-07-16 ASSESSMENT — PATIENT HEALTH QUESTIONNAIRE - PHQ9
CLINICAL INTERPRETATION OF PHQ2 SCORE: 5
5. POOR APPETITE OR OVEREATING: 0 - NOT AT ALL
SUM OF ALL RESPONSES TO PHQ QUESTIONS 1-9: 10

## 2025-07-16 ASSESSMENT — PAIN SCALES - GENERAL: PAINLEVEL_OUTOF10: 6=MODERATE PAIN

## 2025-07-16 NOTE — PROGRESS NOTES
[facility] (Physical Medicine and Rehabilitation)  Sports Medicine& Interventional Spine   Follow Up Patient Visit      Chief complaint:   Chief Complaint   Patient presents with    Follow-Up     Neck pain          HISTORY        HPI  Patient identification: Stefany Thornton ,  1955,   With The primary encounter diagnosis was Cervical radiculopathy. A diagnosis of Chronic neck pain was also pertinent to this visit.     At last visit dated 5/10/25  1. Chronic low back pain.  The chronic low back pain is likely due to degenerative changes in the lumbar spine, specifically at L4-L5 and L5-S1, with severe facet arthritis and disk bulging. There is no evidence of spinal cord compression. The pain is likely originating from the joints of L4-L5 and L5-S1. Physical therapy is recommended to strengthen the back muscles. If there is no improvement after 6 to 8 weeks of physical therapy, medial branch blocks may be considered. If the blocks are successful, a medial branch neurotomy could provide long-term relief. Continue current medications including meloxicam, gabapentin, and Tylenol as directed.    2. Cervical spine pain.  The patient reports aching pain in the neck radiating to the shoulders and arms, with a sensation of dead weight in the arms. X-rays of the cervical spine have been ordered to further evaluate the condition.      Interval History:  History of Present Illness  The patient is a 70-year-old female who presents today for repeat evaluation of chronic low back pain.    She reports pain today as 6 out of 10 in severity and notes new pain occurring at the neck with radiation to bilateral upper extremities, worse on the left with numbness and tingling throughout the left arm. She was last seen on 2025 for an x-ray of the neck, which showed multilevel degenerative changes with facet arthropathy as well as degenerative disk disease at C5-C6 and C6-C7. An MRI of the cervical spine dated  07/04/2025 showed mild central canal stenosis at C6-C7 and C5-C6, severe bilateral neuroforaminal stenosis at C5-C6, and moderate bilateral neuroforaminal stenosis at C6-C7.    She reports experiencing neck pain that extends across her shoulders and into her arms. Her left hand is almost constantly numb and feels significantly weaker than her right hand. She also experiences headaches and a sharp, burning pain from her neck to her shoulder, which is more pronounced on the left side. She has been undergoing physical therapy for the past 4 to 6 weeks with no improvement in symptoms and was scheduled to start a new session on 08/05/2025, but the therapist suggested she seek further medical attention as the therapy did not seem to be effective. She has a physical therapy session scheduled for tomorrow.       ROS Red Flags :   Fever, Chills, Sweats: Denies  Involuntary Weight Loss: Denies  Bowel/Bladder Incontinence: Denies  Saddle Anesthesia: Denies        PMHx:   Past Medical History[1]    PSHx:   Past Surgical History[2]    Family history   Family History   Problem Relation Age of Onset    Heart Disease Mother     Cancer Mother         colon    Cancer Father         prostate    Cancer Brother         lung - smoker         Medications:   Active Medications[3]     Medications Ordered Prior to Encounter[4]      Allergies:   Allergies[5]    Social Hx:   Social History     Socioeconomic History    Marital status:      Spouse name: Not on file    Number of children: Not on file    Years of education: Not on file    Highest education level: Not on file   Occupational History    Not on file   Tobacco Use    Smoking status: Every Day     Current packs/day: 0.50     Average packs/day: 0.5 packs/day for 51.9 years (25.9 ttl pk-yrs)     Types: Cigarettes     Start date: 9/2/1973    Smokeless tobacco: Never    Tobacco comments:     less than 1 ppd.  quit for 10 yrs in her 30's   Vaping Use    Vaping status: Never Used  "  Substance and Sexual Activity    Alcohol use: Yes     Alcohol/week: 8.4 oz     Types: 14 Shots of liquor per week     Comment: 1-2 per day    Drug use: Never    Sexual activity: Not on file     Comment: ; three kids; wk:  [employer]; born: [place]   Other Topics Concern    Not on file   Social History Narrative    Not on file     Social Drivers of Health     Financial Resource Strain: Not on file   Food Insecurity: Not on file   Transportation Needs: Not on file   Physical Activity: Not on file   Stress: Not on file   Social Connections: Not on file   Intimate Partner Violence: Not on file   Housing Stability: Not on file         EXAMINATION     Physical Exam:   Vitals: BP (!) 159/60 (BP Location: Right arm, Patient Position: Sitting, BP Cuff Size: Large adult)   Pulse 91   Temp 36 °C (96.8 °F) (Temporal)   Ht 1.588 m (5' 2.5\")   Wt 98 kg (216 lb)   SpO2 91%     Constitutional:   Body Habitus: Body mass index is 38.88 kg/m².  Cooperation: Fully cooperates with exam  Appearance: Well-groomed no disheveled    Respiratory-  breathing comfortable on room air, no audible wheezing  Cardiovascular- capillary refills less than 2 seconds. No lower extremity edema is noted.   Psychiatric- alert and oriented ×3. Normal affect.    MSK and Neuro: -    Physical Exam  Neck: Mild decrease in range of motion throughout the neck, particularly worse with left-sided side bending and rotation. Positive Spurling sign bilaterally.  Musculoskeletal  - Left upper extremity: Mild decrease in strength with 4 out of 5 strength with elbow extension and wrist extension.     Cervical spine   Inspection: No deformities of the skin over the cervical spine. No rashes or lesions.  decreased A/P ROM in all directions, with  pain    Spurling’s sign: positive bilaterally  No signs of muscular atrophy in bilateral upper extremities   + tenderness to palpation of the cervical facets    Neuro   Key points for the international standards for " "neurological classification of spinal cord injury (ISNCSCI) to light touch.   Dermatome R L   C4 2 2   C5 2 2   C6 2 1   C7 2 1   C8 2 1   T1 2 2     Motor Exam Upper Extremities   ? Myotome R L   Shoulder flexion C5 5 5   Elbow flexion C5 5 5   Wrist extension C6 5 4   Elbow extension C7 5 4   Finger flexion C8 5 5   Finger abduction T1 5 5     Reflexes  ?  R L   Biceps  2+ 2+   Brachioradialis  2+ 2+           MEDICAL DECISION MAKING    DATA    Labs:   Lab Results   Component Value Date/Time    SODIUM 137 04/03/2025 10:41 AM    POTASSIUM 4.5 04/03/2025 10:41 AM    CHLORIDE 102 04/03/2025 10:41 AM    CO2 23 04/03/2025 10:41 AM    GLUCOSE 105 (H) 04/03/2025 10:41 AM    BUN 18 04/03/2025 10:41 AM    CREATININE 0.92 04/03/2025 10:41 AM        No results found for: \"PROTHROMBTM\", \"INR\"     Lab Results   Component Value Date/Time    WBC 8.9 04/03/2025 10:41 AM    RBC 4.96 04/03/2025 10:41 AM    HEMOGLOBIN 14.8 04/03/2025 10:41 AM    HEMATOCRIT 46.7 04/03/2025 10:41 AM    MCV 94.2 04/03/2025 10:41 AM    MCH 29.8 04/03/2025 10:41 AM    MCHC 31.7 (L) 04/03/2025 10:41 AM    MPV 10.7 04/03/2025 10:41 AM    NEUTSPOLYS 62.10 04/03/2025 10:41 AM    LYMPHOCYTES 26.50 04/03/2025 10:41 AM    MONOCYTES 8.00 04/03/2025 10:41 AM    EOSINOPHILS 2.00 04/03/2025 10:41 AM    BASOPHILS 0.90 04/03/2025 10:41 AM        Lab Results   Component Value Date/Time    HBA1C 5.5 04/03/2025 10:41 AM          Imaging:   I personally reviewed following images          Results  Imaging   - X-ray of the neck: 06/23/2025, Multilevel degenerative changes with facet arthropathy as well as degenerative disk disease at C5-C6 and C6-C7.   - MRI of the cervical spine: 07/04/2025, Mild central canal stenosis at C6-C7 and C5-C6. Severe bilateral neuroforaminal stenosis at C5-C6 and moderate bilateral neuroforaminal stenosis at C6-C7.       I reviewed the following radiology reports                     Results for orders placed during the hospital encounter of " 07/03/25    MR-CERVICAL SPINE-W/O    Impression  Multilevel degenerative changes of the cervical spine as described above.      Results for orders placed during the hospital encounter of 05/28/25    MR-LUMBAR SPINE-W/O    Impression  Grade 1 anterolisthesis of L4 over L5.    Moderate canal narrowing at L4-5. Moderate to severe bilateral foraminal narrowing at L4-5.    Severe bilateral foraminal narrowing at L5-S1. There is probably an intermediate signal left facet origin synovial cyst projecting into the posterior left foramen and impinging upon the left L5 nerve.      Cholelithiasis without cholecystitis. The common bile duct is slightly enlarged. Choledocholithiasis cannot be completely excluded.        Results for orders placed during the hospital encounter of 05/28/25    MR-LUMBAR SPINE-W/O    Impression  Grade 1 anterolisthesis of L4 over L5.    Moderate canal narrowing at L4-5. Moderate to severe bilateral foraminal narrowing at L4-5.    Severe bilateral foraminal narrowing at L5-S1. There is probably an intermediate signal left facet origin synovial cyst projecting into the posterior left foramen and impinging upon the left L5 nerve.      Cholelithiasis without cholecystitis. The common bile duct is slightly enlarged. Choledocholithiasis cannot be completely excluded.                                                                                         Results for orders placed during the hospital encounter of 06/21/25    DX-CERVICAL SPINE-2 OR 3 VIEWS    Impression  Multilevel degenerative changes.                     Results for orders placed during the hospital encounter of 10/25/22    DX-KNEE 2- RIGHT    Impression  1. Status post right knee replacement without evidence of hardware complication.     Results for orders placed in visit on 07/15/19    DX-KNEE COMPLETE 4+ RIGHT    Impression  No evidence of acute fracture or dislocation.    Small joint effusion.   Results for orders placed during the  hospital encounter of 22    DX-LUMBAR SPINE-2 OR 3 VIEWS    Impression  1.  Grade 1 anterolisthesis of L4 on L5.  2.  Minimal retrolisthesis of L1 on L2.  3.  Multilevel degenerative changes, most prominent in the lower thoracic spine and at L5/S1.  4.  Facet arthropathy throughout the lumbar spine.  5.  Atherosclerotic plaque.                         DIAGNOSIS   Visit Diagnoses     ICD-10-CM   1. Cervical radiculopathy  M54.12   2. Chronic neck pain  M54.2    G89.29         ASSESSMENT and PLAN:     Stefany Thornton   : 1955     Assessment & Plan  1. Chronic low back pain:  - She has been experiencing chronic low back pain with a current severity of 6 out of 10.  -   2. Cervical radiculopathy:  - She reports pain in the neck with radiation to bilateral upper extremities, worse on the left, accompanied by numbness and tingling in the left arm.  - Imaging studies, including an x-ray dated 2025 and an MRI dated 2025, show multilevel degenerative changes, facet arthropathy, degenerative disk disease at C5-C6 and C6-C7, mild central canal stenosis at C6-C7 and C5-C6, severe bilateral neuroforaminal stenosis at C5-C6, and moderate bilateral neuroforaminal stenosis at C6-C7. Physical examination reveals mild decrease in range of motion, positive Spurling sign bilaterally, and mild decrease in strength on the left side.  She has failed oral medications including nonsteroidal anti-inflammatories and Tylenol, and has not seen improvement with physical therapy over the past 4 to 6 weeks.  - Findings are consistent with bilateral radiculopathy, slightly worse on the left. A left paramedian interlaminar C7-T1 epidural steroid injection is recommended for symptomatic relief.      Conservative treatments including the past two months within the past six months  NSAIDs: yes  Acetaminophen: yes  Home exercise program or physical therapy: yes  Activity modification: yes, difficulty holding and grasping  objects with the left hand    Follow-up: The patient will follow up in 2 weeks.      1. Cervical radiculopathy  Pain Hospital Procedure      2. Chronic neck pain  Pain Hospital Procedure          Orders Placed This Encounter    Pain Hospital Procedure       Follow-up:   Return to clinic in 2 weeks post injections      Patient expressed understanding of the management plan. Patient (and Family Members) was/were encouraged to call if any worries, issues, problems or concerns prior to the next visit     Please note that this dictation was created using voice recognition software. I have made every reasonable attempt to correct obvious errors but there may be errors of grammar and content that I may have overlooked prior to finalization of this note.      Anshu Mar DO  Physical Medicine and Rehabilitation  Sports Medicine and Spine  [facility]                [1]   Past Medical History:  Diagnosis Date    Arthritis     knees    Pain     Right knee   [2]   Past Surgical History:  Procedure Laterality Date    KNEE ARTHROPLASTY TOTAL Right 10/25/2022    Procedure: RIGHT TOTAL KNEE REPLACEMENT;  Surgeon: Roge Gomez M.D.;  Location: SURGERY [facility];  Service: Orthopedics    HAND SURGERY  1985    right wrist ligament contraction release in her 30's   [3]   Outpatient Medications Marked as Taking for the 7/16/25 encounter (Office Visit) with Anshu Mar D.O.   Medication Sig Dispense Refill    gabapentin (NEURONTIN) 300 MG Cap Take 1-3 Capsules by mouth 3 times a day. 270 Capsule 3    losartan (COZAAR) 50 MG Tab Take 1 Tablet by mouth every day. At night 100 Tablet 3    meloxicam (MOBIC) 15 MG tablet TAKE 1 TABLET BY MOUTH EVERY DAY 30 Tablet 3    rosuvastatin (CRESTOR) 10 MG Tab Take 1 Tablet by mouth every evening. For cholesterol. 90 Tablet 3    diclofenac sodium (VOLTAREN) 1 % Gel Apply 2 g topically 4 times a day as needed (to arthritis joints). 300 g 5    aspirin EC (ECOTRIN) 325 MG Tablet  Delayed Response Take 325 mg by mouth every day.      Potassium 99 MG Tab Take  by mouth every day.      VITAMIN D PO Take 1,000 Units by mouth every day.      diphenhydrAMINE (BENADRYL) 25 MG Tab Take 25 mg by mouth every 6 hours as needed for Sleep.      Multiple Vitamin (MULTI-VITAMIN PO) Take  by mouth every day.      Ascorbic Acid (VITAMIN C) 1000 MG Tab Take  by mouth every day.      Magnesium 300 MG Cap Take  by mouth every day.      B Complex Vitamins (VITAMIN B COMPLEX PO) Take  by mouth every day.     [4]   Current Outpatient Medications on File Prior to Visit   Medication Sig Dispense Refill    gabapentin (NEURONTIN) 300 MG Cap Take 1-3 Capsules by mouth 3 times a day. 270 Capsule 3    losartan (COZAAR) 50 MG Tab Take 1 Tablet by mouth every day. At night 100 Tablet 3    meloxicam (MOBIC) 15 MG tablet TAKE 1 TABLET BY MOUTH EVERY DAY 30 Tablet 3    rosuvastatin (CRESTOR) 10 MG Tab Take 1 Tablet by mouth every evening. For cholesterol. 90 Tablet 3    diclofenac sodium (VOLTAREN) 1 % Gel Apply 2 g topically 4 times a day as needed (to arthritis joints). 300 g 5    aspirin EC (ECOTRIN) 325 MG Tablet Delayed Response Take 325 mg by mouth every day.      Potassium 99 MG Tab Take  by mouth every day.      VITAMIN D PO Take 1,000 Units by mouth every day.      diphenhydrAMINE (BENADRYL) 25 MG Tab Take 25 mg by mouth every 6 hours as needed for Sleep.      Multiple Vitamin (MULTI-VITAMIN PO) Take  by mouth every day.      Ascorbic Acid (VITAMIN C) 1000 MG Tab Take  by mouth every day.      Magnesium 300 MG Cap Take  by mouth every day.      B Complex Vitamins (VITAMIN B COMPLEX PO) Take  by mouth every day.       No current facility-administered medications on file prior to visit.   [5] No Known Allergies

## 2025-07-16 NOTE — H&P (VIEW-ONLY)
[facility] (Physical Medicine and Rehabilitation)  Sports Medicine& Interventional Spine   Follow Up Patient Visit      Chief complaint:   Chief Complaint   Patient presents with    Follow-Up     Neck pain          HISTORY        HPI  Patient identification: Stefany Thornton ,  1955,   With The primary encounter diagnosis was Cervical radiculopathy. A diagnosis of Chronic neck pain was also pertinent to this visit.     At last visit dated 5/10/25  1. Chronic low back pain.  The chronic low back pain is likely due to degenerative changes in the lumbar spine, specifically at L4-L5 and L5-S1, with severe facet arthritis and disk bulging. There is no evidence of spinal cord compression. The pain is likely originating from the joints of L4-L5 and L5-S1. Physical therapy is recommended to strengthen the back muscles. If there is no improvement after 6 to 8 weeks of physical therapy, medial branch blocks may be considered. If the blocks are successful, a medial branch neurotomy could provide long-term relief. Continue current medications including meloxicam, gabapentin, and Tylenol as directed.    2. Cervical spine pain.  The patient reports aching pain in the neck radiating to the shoulders and arms, with a sensation of dead weight in the arms. X-rays of the cervical spine have been ordered to further evaluate the condition.      Interval History:  History of Present Illness  The patient is a 70-year-old female who presents today for repeat evaluation of chronic low back pain.    She reports pain today as 6 out of 10 in severity and notes new pain occurring at the neck with radiation to bilateral upper extremities, worse on the left with numbness and tingling throughout the left arm. She was last seen on 2025 for an x-ray of the neck, which showed multilevel degenerative changes with facet arthropathy as well as degenerative disk disease at C5-C6 and C6-C7. An MRI of the cervical spine dated  07/04/2025 showed mild central canal stenosis at C6-C7 and C5-C6, severe bilateral neuroforaminal stenosis at C5-C6, and moderate bilateral neuroforaminal stenosis at C6-C7.    She reports experiencing neck pain that extends across her shoulders and into her arms. Her left hand is almost constantly numb and feels significantly weaker than her right hand. She also experiences headaches and a sharp, burning pain from her neck to her shoulder, which is more pronounced on the left side. She has been undergoing physical therapy for the past 4 to 6 weeks with no improvement in symptoms and was scheduled to start a new session on 08/05/2025, but the therapist suggested she seek further medical attention as the therapy did not seem to be effective. She has a physical therapy session scheduled for tomorrow.       ROS Red Flags :   Fever, Chills, Sweats: Denies  Involuntary Weight Loss: Denies  Bowel/Bladder Incontinence: Denies  Saddle Anesthesia: Denies        PMHx:   Past Medical History[1]    PSHx:   Past Surgical History[2]    Family history   Family History   Problem Relation Age of Onset    Heart Disease Mother     Cancer Mother         colon    Cancer Father         prostate    Cancer Brother         lung - smoker         Medications:   Active Medications[3]     Medications Ordered Prior to Encounter[4]      Allergies:   Allergies[5]    Social Hx:   Social History     Socioeconomic History    Marital status:      Spouse name: Not on file    Number of children: Not on file    Years of education: Not on file    Highest education level: Not on file   Occupational History    Not on file   Tobacco Use    Smoking status: Every Day     Current packs/day: 0.50     Average packs/day: 0.5 packs/day for 51.9 years (25.9 ttl pk-yrs)     Types: Cigarettes     Start date: 9/2/1973    Smokeless tobacco: Never    Tobacco comments:     less than 1 ppd.  quit for 10 yrs in her 30's   Vaping Use    Vaping status: Never Used  "  Substance and Sexual Activity    Alcohol use: Yes     Alcohol/week: 8.4 oz     Types: 14 Shots of liquor per week     Comment: 1-2 per day    Drug use: Never    Sexual activity: Not on file     Comment: ; three kids; wk:  [employer]; born: [place]   Other Topics Concern    Not on file   Social History Narrative    Not on file     Social Drivers of Health     Financial Resource Strain: Not on file   Food Insecurity: Not on file   Transportation Needs: Not on file   Physical Activity: Not on file   Stress: Not on file   Social Connections: Not on file   Intimate Partner Violence: Not on file   Housing Stability: Not on file         EXAMINATION     Physical Exam:   Vitals: BP (!) 159/60 (BP Location: Right arm, Patient Position: Sitting, BP Cuff Size: Large adult)   Pulse 91   Temp 36 °C (96.8 °F) (Temporal)   Ht 1.588 m (5' 2.5\")   Wt 98 kg (216 lb)   SpO2 91%     Constitutional:   Body Habitus: Body mass index is 38.88 kg/m².  Cooperation: Fully cooperates with exam  Appearance: Well-groomed no disheveled    Respiratory-  breathing comfortable on room air, no audible wheezing  Cardiovascular- capillary refills less than 2 seconds. No lower extremity edema is noted.   Psychiatric- alert and oriented ×3. Normal affect.    MSK and Neuro: -    Physical Exam  Neck: Mild decrease in range of motion throughout the neck, particularly worse with left-sided side bending and rotation. Positive Spurling sign bilaterally.  Musculoskeletal  - Left upper extremity: Mild decrease in strength with 4 out of 5 strength with elbow extension and wrist extension.     Cervical spine   Inspection: No deformities of the skin over the cervical spine. No rashes or lesions.  decreased A/P ROM in all directions, with  pain    Spurling’s sign: positive bilaterally  No signs of muscular atrophy in bilateral upper extremities   + tenderness to palpation of the cervical facets    Neuro   Key points for the international standards for " "neurological classification of spinal cord injury (ISNCSCI) to light touch.   Dermatome R L   C4 2 2   C5 2 2   C6 2 1   C7 2 1   C8 2 1   T1 2 2     Motor Exam Upper Extremities   ? Myotome R L   Shoulder flexion C5 5 5   Elbow flexion C5 5 5   Wrist extension C6 5 4   Elbow extension C7 5 4   Finger flexion C8 5 5   Finger abduction T1 5 5     Reflexes  ?  R L   Biceps  2+ 2+   Brachioradialis  2+ 2+           MEDICAL DECISION MAKING    DATA    Labs:   Lab Results   Component Value Date/Time    SODIUM 137 04/03/2025 10:41 AM    POTASSIUM 4.5 04/03/2025 10:41 AM    CHLORIDE 102 04/03/2025 10:41 AM    CO2 23 04/03/2025 10:41 AM    GLUCOSE 105 (H) 04/03/2025 10:41 AM    BUN 18 04/03/2025 10:41 AM    CREATININE 0.92 04/03/2025 10:41 AM        No results found for: \"PROTHROMBTM\", \"INR\"     Lab Results   Component Value Date/Time    WBC 8.9 04/03/2025 10:41 AM    RBC 4.96 04/03/2025 10:41 AM    HEMOGLOBIN 14.8 04/03/2025 10:41 AM    HEMATOCRIT 46.7 04/03/2025 10:41 AM    MCV 94.2 04/03/2025 10:41 AM    MCH 29.8 04/03/2025 10:41 AM    MCHC 31.7 (L) 04/03/2025 10:41 AM    MPV 10.7 04/03/2025 10:41 AM    NEUTSPOLYS 62.10 04/03/2025 10:41 AM    LYMPHOCYTES 26.50 04/03/2025 10:41 AM    MONOCYTES 8.00 04/03/2025 10:41 AM    EOSINOPHILS 2.00 04/03/2025 10:41 AM    BASOPHILS 0.90 04/03/2025 10:41 AM        Lab Results   Component Value Date/Time    HBA1C 5.5 04/03/2025 10:41 AM          Imaging:   I personally reviewed following images          Results  Imaging   - X-ray of the neck: 06/23/2025, Multilevel degenerative changes with facet arthropathy as well as degenerative disk disease at C5-C6 and C6-C7.   - MRI of the cervical spine: 07/04/2025, Mild central canal stenosis at C6-C7 and C5-C6. Severe bilateral neuroforaminal stenosis at C5-C6 and moderate bilateral neuroforaminal stenosis at C6-C7.       I reviewed the following radiology reports                     Results for orders placed during the hospital encounter of " 07/03/25    MR-CERVICAL SPINE-W/O    Impression  Multilevel degenerative changes of the cervical spine as described above.      Results for orders placed during the hospital encounter of 05/28/25    MR-LUMBAR SPINE-W/O    Impression  Grade 1 anterolisthesis of L4 over L5.    Moderate canal narrowing at L4-5. Moderate to severe bilateral foraminal narrowing at L4-5.    Severe bilateral foraminal narrowing at L5-S1. There is probably an intermediate signal left facet origin synovial cyst projecting into the posterior left foramen and impinging upon the left L5 nerve.      Cholelithiasis without cholecystitis. The common bile duct is slightly enlarged. Choledocholithiasis cannot be completely excluded.        Results for orders placed during the hospital encounter of 05/28/25    MR-LUMBAR SPINE-W/O    Impression  Grade 1 anterolisthesis of L4 over L5.    Moderate canal narrowing at L4-5. Moderate to severe bilateral foraminal narrowing at L4-5.    Severe bilateral foraminal narrowing at L5-S1. There is probably an intermediate signal left facet origin synovial cyst projecting into the posterior left foramen and impinging upon the left L5 nerve.      Cholelithiasis without cholecystitis. The common bile duct is slightly enlarged. Choledocholithiasis cannot be completely excluded.                                                                                         Results for orders placed during the hospital encounter of 06/21/25    DX-CERVICAL SPINE-2 OR 3 VIEWS    Impression  Multilevel degenerative changes.                     Results for orders placed during the hospital encounter of 10/25/22    DX-KNEE 2- RIGHT    Impression  1. Status post right knee replacement without evidence of hardware complication.     Results for orders placed in visit on 07/15/19    DX-KNEE COMPLETE 4+ RIGHT    Impression  No evidence of acute fracture or dislocation.    Small joint effusion.   Results for orders placed during the  hospital encounter of 22    DX-LUMBAR SPINE-2 OR 3 VIEWS    Impression  1.  Grade 1 anterolisthesis of L4 on L5.  2.  Minimal retrolisthesis of L1 on L2.  3.  Multilevel degenerative changes, most prominent in the lower thoracic spine and at L5/S1.  4.  Facet arthropathy throughout the lumbar spine.  5.  Atherosclerotic plaque.                         DIAGNOSIS   Visit Diagnoses     ICD-10-CM   1. Cervical radiculopathy  M54.12   2. Chronic neck pain  M54.2    G89.29         ASSESSMENT and PLAN:     Stefany Thornton   : 1955     Assessment & Plan  1. Chronic low back pain:  - She has been experiencing chronic low back pain with a current severity of 6 out of 10.  -   2. Cervical radiculopathy:  - She reports pain in the neck with radiation to bilateral upper extremities, worse on the left, accompanied by numbness and tingling in the left arm.  - Imaging studies, including an x-ray dated 2025 and an MRI dated 2025, show multilevel degenerative changes, facet arthropathy, degenerative disk disease at C5-C6 and C6-C7, mild central canal stenosis at C6-C7 and C5-C6, severe bilateral neuroforaminal stenosis at C5-C6, and moderate bilateral neuroforaminal stenosis at C6-C7. Physical examination reveals mild decrease in range of motion, positive Spurling sign bilaterally, and mild decrease in strength on the left side.  She has failed oral medications including nonsteroidal anti-inflammatories and Tylenol, and has not seen improvement with physical therapy over the past 4 to 6 weeks.  - Findings are consistent with bilateral radiculopathy, slightly worse on the left. A left paramedian interlaminar C7-T1 epidural steroid injection is recommended for symptomatic relief.      Conservative treatments including the past two months within the past six months  NSAIDs: yes  Acetaminophen: yes  Home exercise program or physical therapy: yes  Activity modification: yes, difficulty holding and grasping  objects with the left hand    Follow-up: The patient will follow up in 2 weeks.      1. Cervical radiculopathy  [facility] Procedure      2. Chronic neck pain  [facility] Hospital Procedure          Orders Placed This Encounter    [facility] Hospital Procedure       Follow-up:   Return to clinic in 2 weeks post injections      Patient expressed understanding of the management plan. Patient (and Family Members) was/were encouraged to call if any worries, issues, problems or concerns prior to the next visit     Please note that this dictation was created using voice recognition software. I have made every reasonable attempt to correct obvious errors but there may be errors of grammar and content that I may have overlooked prior to finalization of this note.      Anshu Mar DO  Physical Medicine and Rehabilitation  Sports Medicine and Spine  [facility]                [1]   Past Medical History:  Diagnosis Date    Arthritis     knees    Pain     Right knee   [2]   Past Surgical History:  Procedure Laterality Date    KNEE ARTHROPLASTY TOTAL Right 10/25/2022    Procedure: RIGHT TOTAL KNEE REPLACEMENT;  Surgeon: Roge Gomez M.D.;  Location: SURGERY [facility];  Service: Orthopedics    HAND SURGERY  1985    right wrist ligament contraction release in her 30's   [3]   Outpatient Medications Marked as Taking for the 7/16/25 encounter (Office Visit) with Anshu Mar D.O.   Medication Sig Dispense Refill    gabapentin (NEURONTIN) 300 MG Cap Take 1-3 Capsules by mouth 3 times a day. 270 Capsule 3    losartan (COZAAR) 50 MG Tab Take 1 Tablet by mouth every day. At night 100 Tablet 3    meloxicam (MOBIC) 15 MG tablet TAKE 1 TABLET BY MOUTH EVERY DAY 30 Tablet 3    rosuvastatin (CRESTOR) 10 MG Tab Take 1 Tablet by mouth every evening. For cholesterol. 90 Tablet 3    diclofenac sodium (VOLTAREN) 1 % Gel Apply 2 g topically 4 times a day as needed (to arthritis joints). 300 g 5    aspirin EC (ECOTRIN) 325 MG Tablet  Delayed Response Take 325 mg by mouth every day.      Potassium 99 MG Tab Take  by mouth every day.      VITAMIN D PO Take 1,000 Units by mouth every day.      diphenhydrAMINE (BENADRYL) 25 MG Tab Take 25 mg by mouth every 6 hours as needed for Sleep.      Multiple Vitamin (MULTI-VITAMIN PO) Take  by mouth every day.      Ascorbic Acid (VITAMIN C) 1000 MG Tab Take  by mouth every day.      Magnesium 300 MG Cap Take  by mouth every day.      B Complex Vitamins (VITAMIN B COMPLEX PO) Take  by mouth every day.     [4]   Current Outpatient Medications on File Prior to Visit   Medication Sig Dispense Refill    gabapentin (NEURONTIN) 300 MG Cap Take 1-3 Capsules by mouth 3 times a day. 270 Capsule 3    losartan (COZAAR) 50 MG Tab Take 1 Tablet by mouth every day. At night 100 Tablet 3    meloxicam (MOBIC) 15 MG tablet TAKE 1 TABLET BY MOUTH EVERY DAY 30 Tablet 3    rosuvastatin (CRESTOR) 10 MG Tab Take 1 Tablet by mouth every evening. For cholesterol. 90 Tablet 3    diclofenac sodium (VOLTAREN) 1 % Gel Apply 2 g topically 4 times a day as needed (to arthritis joints). 300 g 5    aspirin EC (ECOTRIN) 325 MG Tablet Delayed Response Take 325 mg by mouth every day.      Potassium 99 MG Tab Take  by mouth every day.      VITAMIN D PO Take 1,000 Units by mouth every day.      diphenhydrAMINE (BENADRYL) 25 MG Tab Take 25 mg by mouth every 6 hours as needed for Sleep.      Multiple Vitamin (MULTI-VITAMIN PO) Take  by mouth every day.      Ascorbic Acid (VITAMIN C) 1000 MG Tab Take  by mouth every day.      Magnesium 300 MG Cap Take  by mouth every day.      B Complex Vitamins (VITAMIN B COMPLEX PO) Take  by mouth every day.       No current facility-administered medications on file prior to visit.   [5] No Known Allergies

## 2025-07-17 NOTE — Clinical Note
REFERRAL APPROVAL NOTICE         Sent on July 17, 2025                   Nette Thornton  [address]  [facility] [address]                   Dear Ms. Thornton,    After a careful review of the medical information and benefit coverage, [facility] has processed your referral. See below for additional details.    If applicable, you must be actively enrolled with your insurance for coverage of the authorized service. If you have any questions regarding your coverage, please contact your insurance directly.    REFERRAL INFORMATION   Referral #:  [id number]  Referred-To Department    Referred-By Provider:  Pain Management    Anshu Mar D.O.   Pain Management       [address]  [address]  [facility] [address]  [phone] [address] [place] [id number]  [phone]    Referral Start Date:  07/16/2025  Referral End Date:   10/17/2025             SCHEDULING  If you do not already have an appointment, please call [phone] to make an appointment.     MORE INFORMATION  If you do not already have a [employer] account, sign up at: [email or web address]  You can access your medical information, make appointments, see lab results, billing information, and more.  If you have questions regarding this referral, please contact  the [facility] Referrals department at:             [phone]. Monday - Friday 8:00AM - 5:00PM.     Sincerely,    [facility]

## 2025-07-30 ENCOUNTER — APPOINTMENT (OUTPATIENT)
Dept: RADIOLOGY | Facility: REHABILITATION | Age: 70
End: 2025-07-30
Attending: PHYSICAL MEDICINE & REHABILITATION
Payer: COMMERCIAL

## 2025-07-30 ENCOUNTER — HOSPITAL ENCOUNTER (OUTPATIENT)
Facility: REHABILITATION | Age: 70
End: 2025-07-30
Attending: PHYSICAL MEDICINE & REHABILITATION | Admitting: PHYSICAL MEDICINE & REHABILITATION
Payer: COMMERCIAL

## 2025-07-30 ASSESSMENT — PAIN DESCRIPTION - PAIN TYPE
TYPE: CHRONIC PAIN
TYPE: CHRONIC PAIN

## 2025-07-30 ASSESSMENT — FIBROSIS 4 INDEX: FIB4 SCORE: 1

## 2025-07-30 NOTE — OR SURGEON
Immediate Post OP Note    Pre-Op Diagnosis Codes:      * Cervical radiculopathy [M54.12]      Post-Op Diagnosis Codes:     * Cervical radiculopathy [M54.12]      Procedure(s):  cervical C7-T1 interlaminar epidural steroid injection - Wound Class: Clean    Surgeon(s):  Dave Pimentel M.D.    Anesthesiologist/Type of Anesthesia:  No anesthesia staff entered./Local    Surgical Staff:  Circulator: Deb Parker R.N.  Scrub Person: eJnnifer Gupta  Radiology Technologist: Mami Napoles    Specimens removed if any:  * No specimens in log *    Estimated Blood Loss: None    Findings: None    Complications: None        7/30/2025 11:15 AM Dave Pimentel M.D.

## 2025-07-30 NOTE — INTERVAL H&P NOTE
Consented Procedure: cervical C7-T1 interlaminar epidural steroid injection  I have examined the patient, provided the risks, benefits, and alternatives to the procedure(s) indicated on the signed consent form, and the patient wishes to proceed.    H&P reviewed. The patient was examined and there are no changes to the H&P      Dave Pimentel M.D.  07/30/25 10:30 AM

## 2025-07-30 NOTE — OP REPORT
Date of Service: 7/30/2025    Physician/s: Dave Pimentel MD    Pre-operative Diagnosis: Cervical radiculopathy    Post-operative Diagnosis: Cervical radiculopathy    Procedure: C7-T1  Cervical interlaminar epidural steroid injection    Description of procedure:    The risks, benefits, and alternatives of the procedure were reviewed and discussed with the patient.  Written informed consent was freely obtained. A pre-procedural time-out was conducted by the physician verifying patient’s identity, procedure to be performed, procedure site and side, and allergy verification. Appropriate equipment was determined to be in place for the procedure.         The patient's vital signs were carefully monitored before, throughout, and after the procedure.     In the fluoroscopy suite the patient was placed in a prone position, a pillow placed underneath their chest. The skin was prepped and draped in the usual sterile fashion. The fluoroscope was placed over the cervical neck at the appropriate injection AP angle view, and the target for injection was marked. A 25g needle was placed into the marked site, and approx 2mL of 1% Lidocaine was injected subcutaneously into the epidermal and dermal layers. The needle was removed. A 20g Tuohy needle was then placed and advanced under fluoroscopic guidance into the  C7-T1 interlaminar space at both the initial position AP view and contralateral oblique at a lateral view to ensure proper location of the needle tip at all times.  The needle was advanced with fluoroscopic guidance to the superior aspect of the T1 lamina.  Then a contralateral oblique view was used to advance the needle slightly towards the epidural space, A loss-of-resistance technique was used to guide the needle into the epidural space in a lateral fluoroscopic view and confirmed with loss of resistance with sterile normal saline. contrast dye was used to highlight the epidural space spread while the fluoroscope was  running live. Following negative aspiration, 1mL of 10mg/mL of dexamethasone followed by 2 mL of sterile saline . The needle was removed intact after restyleted. The patient's back was covered with a 4x4 gauze, the area was cleansed with sterile normal saline, and a dressing was applied. There were no complications noted.     This was a challenging procedure given the patients Body mass index is 38.89 kg/m².  This added to the mental effort for complexity this procedure.  This also made acquiring fluoroscopic images more time-consuming and challenging.  Final fluoroscopic images were obtained and saved.     The patient was then evaluated post-procedure, and was hemodynamically stable prior to leaving the post-operative care unit.       The patient had 50 percent pain relief postprocedure  Preprocedure pain 8/10 NRS  Postprocedure pain 4 /10 NRS    Follow-up as scheduled    Dave Pimentel MD  Interventional Spine and Pain  Physical Medicine and Rehabilitation  [facility]        CPT  interlaminar cervical/thoracic epidural: 13454-89

## 2025-07-31 ENCOUNTER — TELEPHONE (OUTPATIENT)
Dept: PHYSICAL MEDICINE AND REHAB | Facility: MEDICAL CENTER | Age: 70
End: 2025-07-31
Payer: COMMERCIAL

## 2025-07-31 NOTE — TELEPHONE ENCOUNTER
Called for post-sp check-up. Pt reported the following regarding the procedure site: cervical C7-T1 interlaminar epidural steroid injection     Change in pain?: feeling great    Concerns?: No    Confirmed [facility] appt?: Yes

## 2025-08-13 ENCOUNTER — OFFICE VISIT (OUTPATIENT)
Dept: PHYSICAL MEDICINE AND REHAB | Facility: MEDICAL CENTER | Age: 70
End: 2025-08-13
Payer: COMMERCIAL

## 2025-08-13 VITALS
WEIGHT: 216 LBS | HEIGHT: 63 IN | BODY MASS INDEX: 38.27 KG/M2 | OXYGEN SATURATION: 99 % | HEART RATE: 101 BPM | TEMPERATURE: 98.7 F | DIASTOLIC BLOOD PRESSURE: 82 MMHG | SYSTOLIC BLOOD PRESSURE: 138 MMHG

## 2025-08-13 DIAGNOSIS — M54.2 CHRONIC NECK PAIN: Primary | ICD-10-CM

## 2025-08-13 DIAGNOSIS — G89.29 CHRONIC NECK PAIN: Primary | ICD-10-CM

## 2025-08-13 DIAGNOSIS — M54.50 CHRONIC BILATERAL LOW BACK PAIN WITHOUT SCIATICA: ICD-10-CM

## 2025-08-13 DIAGNOSIS — M47.816 SPONDYLOSIS OF LUMBAR SPINE: ICD-10-CM

## 2025-08-13 DIAGNOSIS — G89.29 CHRONIC BILATERAL LOW BACK PAIN WITHOUT SCIATICA: ICD-10-CM

## 2025-08-13 DIAGNOSIS — M47.812 SPONDYLOSIS OF CERVICAL SPINE: ICD-10-CM

## 2025-08-13 DIAGNOSIS — M48.061 FORAMINAL STENOSIS OF LUMBAR REGION: ICD-10-CM

## 2025-08-13 PROCEDURE — 3075F SYST BP GE 130 - 139MM HG: CPT | Performed by: STUDENT IN AN ORGANIZED HEALTH CARE EDUCATION/TRAINING PROGRAM

## 2025-08-13 PROCEDURE — 99213 OFFICE O/P EST LOW 20 MIN: CPT | Performed by: STUDENT IN AN ORGANIZED HEALTH CARE EDUCATION/TRAINING PROGRAM

## 2025-08-13 PROCEDURE — 3079F DIAST BP 80-89 MM HG: CPT | Performed by: STUDENT IN AN ORGANIZED HEALTH CARE EDUCATION/TRAINING PROGRAM

## 2025-08-13 ASSESSMENT — PAIN SCALES - GENERAL: PAINLEVEL_OUTOF10: 3=SLIGHT PAIN

## 2025-08-13 ASSESSMENT — PATIENT HEALTH QUESTIONNAIRE - PHQ9: CLINICAL INTERPRETATION OF PHQ2 SCORE: 0

## 2025-08-13 ASSESSMENT — FIBROSIS 4 INDEX: FIB4 SCORE: 1

## 2025-09-03 ENCOUNTER — APPOINTMENT (OUTPATIENT)
Dept: PHYSICAL MEDICINE AND REHAB | Facility: MEDICAL CENTER | Age: 70
End: 2025-09-03
Payer: COMMERCIAL

## (undated) DEVICE — TUBING CLEARLINK DUO-VENT - C-FLO (48EA/CA)

## (undated) DEVICE — GLOVE BIOGEL SZ 7 SURGICAL PF LTX - (50PR/BX 4BX/CA)

## (undated) DEVICE — SUTURE GENERAL

## (undated) DEVICE — LENS/HOOD FOR SPACESUIT - (32/PK) PEEL AWAY FACE

## (undated) DEVICE — SODIUM CHL IRRIGATION 0.9% 1000ML (12EA/CA)

## (undated) DEVICE — GLOVE BIOGEL SZ 7.5 SURGICAL PF LTX - (50PR/BX 4BX/CA)

## (undated) DEVICE — TOWEL STOP TIMEOUT SAFETY FLAG (40EA/CA)

## (undated) DEVICE — CANISTER SUCTION RIGID RED 1500CC (40EA/CA)

## (undated) DEVICE — PAD PREP 24 X 48 CUFFED - (100/CA)

## (undated) DEVICE — DRAPE LARGE 3 QUARTER - (20/CA)

## (undated) DEVICE — Device

## (undated) DEVICE — GLOVE BIOGEL SZ 6.5 SURGICAL PF LTX (50PR/BX 4BX/CA)

## (undated) DEVICE — GLOVE BIOGEL PI INDICATOR SZ 7.5 SURGICAL PF LF -(50/BX 4BX/CA)

## (undated) DEVICE — LACTATED RINGERS INJ 1000 ML - (14EA/CA 60CA/PF)

## (undated) DEVICE — SUTURE 2-0 VICRYL PLUS CT-1 36 (36PK/BX)"

## (undated) DEVICE — PACK MAJOR ORTHO - (2EA/CA)

## (undated) DEVICE — GLOVE BIOGEL INDICATOR SZ 8 SURGICAL PF LTX - (50/BX 4BX/CA)

## (undated) DEVICE — SENSOR OXIMETER ADULT SPO2 RD SET (20EA/BX)

## (undated) DEVICE — STOCKINETTE IMPERVIOUS 12X48 - STERILELF (10/CA)"

## (undated) DEVICE — HUMID-VENT HEAT AND MOISTURE EXCHANGE- (50/BX)

## (undated) DEVICE — GLOVE, LITE (PAIR)

## (undated) DEVICE — DRESSING AQUACEL AG ADVANTAGE 3.5 X 10" (10EA/BX)"

## (undated) DEVICE — MIXER BONE CEMENT REVOLUTION - W/FEMORAL PRESSURIZER (6/CA)

## (undated) DEVICE — SODIUM CHL. IRRIGATION 0.9% 3000ML (4EA/CA 65CA/PF)

## (undated) DEVICE — BANDAGE ELASTIC STERILE VELCRO 6 X 5 YDS (25EA/CA)

## (undated) DEVICE — GLOVE BIOGEL PI INDICATOR SZ 6.5 SURGICAL PF LF - (50/BX 4BX/CA)

## (undated) DEVICE — BLADE SAW RECIPROCATING DOUBLE SIDED 70MM X 12.5MM X 0.64MM STERILE (1EA)

## (undated) DEVICE — CHLORAPREP 26 ML APPLICATOR - ORANGE TINT(25/CA)

## (undated) DEVICE — BLADE 90X18X1.27MM SAW SAGITTAL

## (undated) DEVICE — SUCTION INSTRUMENT YANKAUER BULBOUS TIP W/O VENT (50EA/CA)

## (undated) DEVICE — PACK TOTAL KNEE  (1/CA)

## (undated) DEVICE — SUTURE 1 VICRYL PLUS CTX - 36 INCH (36/BX)

## (undated) DEVICE — TIP INTPLS HFLO ML ORFC BTRY - (12/CS)  FOR SURGILAV

## (undated) DEVICE — HANDPIECE 10FT INTPLS SCT PLS IRRIGATION HAND CONTROL SET (6/PK)

## (undated) DEVICE — SET EXTENSION WITH 2 PORTS (48EA/CA) ***PART #2C8610 IS A SUBSTITUTE*****

## (undated) DEVICE — PADDING CAST 6 IN X 4 YDS - SOF-ROLL (6RL/BG 6BG/CA)